# Patient Record
Sex: FEMALE | Race: OTHER | URBAN - METROPOLITAN AREA
[De-identification: names, ages, dates, MRNs, and addresses within clinical notes are randomized per-mention and may not be internally consistent; named-entity substitution may affect disease eponyms.]

---

## 2023-09-07 ENCOUNTER — OFFICE VISIT (OUTPATIENT)
Dept: AUDIOLOGY | Facility: CLINIC | Age: 88
End: 2023-09-07

## 2023-09-07 DIAGNOSIS — H90.3 SENSORINEURAL HEARING LOSS, BILATERAL: Primary | ICD-10-CM

## 2023-09-07 PROCEDURE — 92593 HEARING AID CHECK, BOTH EARS: CPT | Performed by: AUDIOLOGIST

## 2023-09-07 NOTE — PROGRESS NOTES
HEARING AID FOLLOW-UP    Meghan Tee  2/2/1931  [id number]    Patient is here for hearing test/hearing aid repair. Patient is here with friend who works with her. German translation provided by language line Rosa #[id number]. Patient has referral from [facility] PCP Charu Mckeon for hearing testing. The patient has the following concerns:   Patient's friend explained that she has hearing aids that are not working and she would like to see about getting them fixed or getting new hearing aids. Medical records show that aids were obtained from [facility] in 2020.  is [employer]. Explained to patient that [employer] hearing aids are not a  that our office can repair or support. Also explained that her currently listed insurance (Medicare) does not provide hearing aids and thus any hearing aid services would be out of pocket payment. In office the following actions were taken:  Offered to provide hearing test anyway. Patient declined. Advised she should seek hearing aid repair services from the dispensing office. N/C consultation today.        9/7/2023  Kb Ortega

## 2024-07-31 ENCOUNTER — HOSPITAL ENCOUNTER (OUTPATIENT)
Dept: GENERAL RADIOLOGY | Facility: HOSPITAL | Age: 89
Discharge: HOME | End: 2024-07-31
Attending: ORTHOPAEDIC SURGERY
Payer: MEDICAID

## 2024-07-31 ENCOUNTER — OFFICE VISIT (OUTPATIENT)
Dept: ORTHOPEDICS CLINIC | Facility: CLINIC | Age: 89
End: 2024-07-31

## 2024-07-31 VITALS — BODY MASS INDEX: 22.38 KG/M2 | WEIGHT: 114 LBS | HEIGHT: 60 IN

## 2024-07-31 DIAGNOSIS — R52 PAIN: Primary | ICD-10-CM

## 2024-07-31 DIAGNOSIS — M17.11 PRIMARY OSTEOARTHRITIS OF RIGHT KNEE: ICD-10-CM

## 2024-07-31 DIAGNOSIS — R52 PAIN: ICD-10-CM

## 2024-07-31 PROCEDURE — 73562 X-RAY EXAM OF KNEE 3: CPT | Performed by: ORTHOPAEDIC SURGERY

## 2024-07-31 PROCEDURE — 99203 OFFICE O/P NEW LOW 30 MIN: CPT | Performed by: ORTHOPAEDIC SURGERY

## 2024-07-31 RX ORDER — EMPAGLIFLOZIN 10 MG/1
TABLET, FILM COATED ORAL
COMMUNITY

## 2024-07-31 RX ORDER — FLURBIPROFEN SODIUM 0.3 MG/ML
1 SOLUTION/ DROPS OPHTHALMIC DAILY
COMMUNITY
Start: 2024-07-13

## 2024-07-31 RX ORDER — METOPROLOL SUCCINATE 25 MG/1
25 TABLET, EXTENDED RELEASE ORAL DAILY
COMMUNITY

## 2024-07-31 RX ORDER — AMMONIUM LACTATE 12 G/100G
CREAM TOPICAL
COMMUNITY
Start: 2024-07-12

## 2024-07-31 RX ORDER — LISINOPRIL 10 MG/1
10 TABLET ORAL DAILY
COMMUNITY

## 2024-07-31 RX ORDER — LEVOTHYROXINE SODIUM 0.03 MG/1
TABLET ORAL
COMMUNITY
Start: 2024-07-12

## 2024-07-31 RX ORDER — ATORVASTATIN CALCIUM 40 MG/1
40 TABLET, FILM COATED ORAL DAILY
COMMUNITY

## 2024-07-31 RX ORDER — ASPIRIN 81 MG/1
81 TABLET ORAL DAILY
COMMUNITY
Start: 2024-02-13

## 2024-07-31 RX ORDER — GLIPIZIDE 10 MG/1
1 TABLET, FILM COATED, EXTENDED RELEASE ORAL
COMMUNITY
Start: 2023-04-13

## 2024-07-31 RX ORDER — DONEPEZIL HYDROCHLORIDE 10 MG/1
10 TABLET, FILM COATED ORAL DAILY
COMMUNITY
Start: 2023-02-27

## 2024-07-31 RX ORDER — DIAPER,BRIEF,INFANT-TODD,DISP
1 EACH MISCELLANEOUS 2 TIMES DAILY
COMMUNITY
Start: 2021-09-28

## 2024-07-31 RX ORDER — INSULIN GLARGINE 100 [IU]/ML
INJECTION, SOLUTION SUBCUTANEOUS
COMMUNITY

## 2024-07-31 NOTE — PROGRESS NOTES
NURSING INTAKE COMMENTS:   Chief Complaint   Patient presents with    Knee Pain     Consult - L knee - Pt states she heard a pop in knee about 1 month ago.  Pt states pain and difficulty going up  and down stairs ever since. Pt here with her family member. Language line in room       HPI: This 89 year old female presents today with complaints of mild left knee pain and some heaviness in the leg when she walks.  She felt a pop in the knee about a month ago and had quite a bit of pain at the time and could not stand on it.  The symptoms have largely resolved, and she has only a little bit of discomfort in the left knee now.  Translation was provided by the language line.    History reviewed. No pertinent past medical history.  History reviewed. No pertinent surgical history.  Current Outpatient Medications   Medication Sig Dispense Refill    Ammonium Lactate 12 % External Cream 1 APPLICATION TOPICALLY TO AFFECTED AREA 2 TIMES PER DAY      aspirin 81 MG Oral Tab EC Take 1 tablet (81 mg total) by mouth daily.      atorvastatin 40 MG Oral Tab Take 1 tablet (40 mg total) by mouth daily.      cholecalciferol 125 MCG (5000 UT) Oral Tab Take 1 tablet (5,000 Units total) by mouth daily.      donepezil 10 MG Oral Tab Take 1 tablet (10 mg total) by mouth daily.      JARDIANCE 10 MG Oral Tab TAKE ONE TABLET BY MOUTH DAILY . TOME 1 TABLETA POR LA BOCA DIARIA      hydrocortisone 1 % External Ointment Apply 1 Application topically 2 (two) times daily.      glipiZIDE ER 10 MG Oral Tablet 24 Hr Take 1 tablet (10 mg total) by mouth before breakfast.      BASAGLAR KWIKPEN 100 UNIT/ML Subcutaneous Solution Pen-injector INJECT 16 UNITS UNDER SKIN AT BEDTIME. REPLACES BASAGLAR      BD PEN NEEDLE MINI U/F 31G X 5 MM Does not apply Misc 1 strip by In Vitro route daily.      levothyroxine 25 MCG Oral Tab TAKE ONE TABLET BY MOUTH DAILY FOR HYPOTHYROID      metoprolol succinate ER 25 MG Oral Tablet 24 Hr Take 1 tablet (25 mg total) by mouth  daily.      metFORMIN 850 MG Oral Tab Take 1 tablet (850 mg total) by mouth 2 (two) times daily with meals.      lisinopril 10 MG Oral Tab Take 1 tablet (10 mg total) by mouth daily.       Not on File  History reviewed. No pertinent family history.    Social History     Occupational History    Not on file   Tobacco Use    Smoking status: Not on file    Smokeless tobacco: Not on file   Substance and Sexual Activity    Alcohol use: Not on file    Drug use: Not on file    Sexual activity: Not on file        Review of Systems:  GENERAL: feels generally well, no significant weight loss or weight gain  SKIN: no ulcerated or worrisome skin lesions  EYES:denies blurred vision or double vision  HEENT: denies new nasal congestion, sinus pain or ST  LUNGS: denies shortness of breath  CARDIOVASCULAR: denies chest pain  GI: no hematemesis, no worsening heartburn, no diarrhea  : no dysuria, no blood in urine, no difficulty urinating, no incontinence  MUSCULOSKELETAL: no other musculoskeletal complaints other than in HPI  NEURO: no numbness or tingling, no weakness or balance disorder  PSYCHE: no depression or anxiety  HEMATOLOGIC: no hx of blood dyscrasia  ENDOCRINE: no thyroid or diabetes issues  ALL/ASTHMA: no new hx of severe allergy or asthma    Physical Examination:    Ht 5' (1.524 m)   Wt 114 lb (51.7 kg)   BMI 22.26 kg/m²   Constitutional: appears well hydrated, alert and responsive, no acute distress noted  Extremities: No effusion left knee.  Full extension actively against gravity and resistance and flexion to 120 degrees.  No instability.  Neurological: Active plantarflexion dorsiflexion of the foot and knee extension.  Pulses: 2+ posterior tibial    Imaging:mild degenerative change.     No results found for: \"WBC\", \"HGB\", \"PLT\"   No results found for: \"GLU\", \"BUN\", \"CREATSERUM\", \"GFR\", \"GFRNAA\", \"GFRAA\"     Assessment and Plan:  Diagnoses and all orders for this visit:    Pain  -     XR KNEE (3 VIEWS), LEFT  (CPT=73562); Future    Primary osteoarthritis of right knee        Assessment: Mild arthritis in the left knee with exacerbation.    Plan: I offered her cortisone injection which she declined because she is not having much pain now.  If the symptoms increase she will follow-up for cortisone injection at that time.  Also recommended physical therapy.  Apparently she is having therapy once a week, and I suggested she do the exercises independently in between therapy sessions.    The above note was creating using Dragon speech recognition technology. Please excuse any typos.    MADISON CASH MD

## 2024-10-09 ENCOUNTER — OFFICE VISIT (OUTPATIENT)
Dept: AUDIOLOGY | Facility: CLINIC | Age: 89
End: 2024-10-09

## 2024-10-09 DIAGNOSIS — H90.3 SENSORINEURAL HEARING LOSS, BILATERAL: Primary | ICD-10-CM

## 2024-10-09 PROCEDURE — 92557 COMPREHENSIVE HEARING TEST: CPT | Performed by: AUDIOLOGIST

## 2024-10-09 NOTE — PROGRESS NOTES
AUDIOGRAM     Karissa Hinds was referred for testing by Nikko Abrams.   2/2/1931  [id number]      Patient is here for testing on referral from URSZULA Velarde  She is accompanied by her caregiver  Sammarinese translation was provided by the video language Pillo fong.  Patient has referral from [facility]    She was last seen in the office in 2023  No testing was done at that time    After some discussion today, it was discovered that patient has Medicaid as well as Medicare.    Otoscopic Inspection:  both ears: mild cerumen and TM visualized    Audio testing was done- see graph  Testing could not be competed for speech discrimination as patient did not understand the task despite multiple attempts to explain via  and caregiver.      Recommendations:  Follow up with URSZULA Velarde.  Audiological monitoring as needed during the course of medical management.      NOTE: patient was given a copy of the audiogram and a list of providers who work with their insurance to obtain hearing aids    10/9/2024  Kb Chester

## 2025-03-12 ENCOUNTER — TELEPHONE (OUTPATIENT)
Dept: AUDIOLOGY | Facility: CLINIC | Age: OVER 89
End: 2025-03-12

## 2025-03-12 NOTE — TELEPHONE ENCOUNTER
Macy [facility] calling to request order [id number] completed 10/09/2024 at 10:22. The results were too dark to view and requesting new. Please fax to [phone], please include patients name,thanks.

## 2025-03-13 NOTE — TELEPHONE ENCOUNTER
Macy from [facility] states she spoke to medical records and they transferred her back to Audiology and states they can not give her medical records. Macy states the audio test results are too dark and asking for them to be sent again more clear. Please fax to [phone]. Please advise.

## 2025-05-26 ENCOUNTER — HOSPITAL ENCOUNTER (EMERGENCY)
Facility: HOSPITAL | Age: OVER 89
Discharge: HOME | End: 2025-05-26
Attending: EMERGENCY MEDICINE
Payer: MEDICARE

## 2025-05-26 ENCOUNTER — APPOINTMENT (OUTPATIENT)
Dept: CT IMAGING | Facility: HOSPITAL | Age: OVER 89
End: 2025-05-26
Attending: EMERGENCY MEDICINE
Payer: MEDICARE

## 2025-05-26 VITALS
SYSTOLIC BLOOD PRESSURE: 169 MMHG | DIASTOLIC BLOOD PRESSURE: 73 MMHG | BODY MASS INDEX: 20.2 KG/M2 | WEIGHT: 114 LBS | RESPIRATION RATE: 16 BRPM | TEMPERATURE: 99 F | HEART RATE: 89 BPM | HEIGHT: 63 IN | OXYGEN SATURATION: 98 %

## 2025-05-26 DIAGNOSIS — S09.90XA INJURY OF HEAD, INITIAL ENCOUNTER: Primary | ICD-10-CM

## 2025-05-26 DIAGNOSIS — S00.91XA ABRASION OF HEAD, INITIAL ENCOUNTER: ICD-10-CM
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
## 2025-05-26 PROCEDURE — 99284 EMERGENCY DEPT VISIT MOD MDM: CPT

## 2025-05-26 PROCEDURE — 72125 CT NECK SPINE W/O DYE: CPT | Performed by: EMERGENCY MEDICINE

## 2025-05-26 PROCEDURE — 70450 CT HEAD/BRAIN W/O DYE: CPT | Performed by: EMERGENCY MEDICINE

## 2025-05-26 NOTE — ED INITIAL ASSESSMENT (HPI)
Pt brought in by family friend  for fall and head injury.  Per friend pt tripped and fell backward hit the back of the head on a concrete floor, sustaining scalp laceration happened 45 minutes ago,  Denies LOC.  Pt taking Aspirin 81 mgs daily.  Pt Icelandic speaking, alert, breathing unlabored.  Pt denies pain.

## 2025-05-26 NOTE — ED PROVIDER NOTES
Patient Seen in: [facility] Emergency Department    History     Chief Complaint   Patient presents with    Fall    Head Injury       HPI    89-year-old female presents ER status post fall.  Patient's grandson at bedside states he had mechanical fall where she fell backwards and hit her head.  Patient did fall on the concrete but states did not lose consciousness.  Patient with positive abrasion to the head.  Patient denies any neck pain.  Patient denies any loss consciousness.    History reviewed. Past Medical History[1]    History reviewed. Past Surgical History[2]      Medications :  Prescriptions Prior to Admission[3]     Family History[4]    Smoking Status: Social Hx on file[5]    ROS  All pertinent positives for the review of systems are mentioned in the HPI  All other organ systems are reviewed and are negative.    Constitutional and vital signs reviewed.      Social History and Family History elements reviewed from today, pertinent positives to the presenting problem noted.    Physical Exam     ED Triage Vitals [05/26/25 1548]   /81   Pulse 94   Resp 20   Temp 98.5 °F (36.9 °C)   Temp src Oral   SpO2 94 %   O2 Device None (Room air)       All measures to prevent infection transmission during my interaction with the patient were taken. The patient was already wearing a droplet mask on my arrival to the room. Personal protective equipment including droplet mask, eye protection, and gloves were worn throughout the duration of the exam.  Handwashing was performed prior to and after the exam.  Stethoscope and any equipment used during my examination was cleaned with super sani-cloth germicidal wipes following the exam.     Physical Exam  Vitals and nursing note reviewed.   Constitutional:       Appearance: She is well-developed.   HENT:      Head: Normocephalic and atraumatic.        Comments: Positive abrasion to the cervical area of the posterior scalp     Right Ear: External ear normal.      Left  Ear: External ear normal.      Nose: Nose normal.   Eyes:      Conjunctiva/sclera: Conjunctivae normal.      Pupils: Pupils are equal, round, and reactive to light.   Neck:      Vascular: No carotid bruit.   Cardiovascular:      Rate and Rhythm: Normal rate and regular rhythm.      Heart sounds: Normal heart sounds.   Pulmonary:      Effort: Pulmonary effort is normal.      Breath sounds: Normal breath sounds.   Abdominal:      General: Bowel sounds are normal.      Palpations: Abdomen is soft.   Musculoskeletal:         General: Normal range of motion.      Cervical back: Normal range of motion and neck supple. No rigidity or tenderness.   Lymphadenopathy:      Cervical: No cervical adenopathy.   Skin:     General: Skin is warm and dry.   Neurological:      Mental Status: She is alert and oriented to person, place, and time.      Deep Tendon Reflexes: Reflexes are normal and symmetric.   Psychiatric:         Behavior: Behavior normal.         Thought Content: Thought content normal.         Judgment: Judgment normal.         ED Course      Labs Reviewed - No data to display      Imaging Results Available and Reviewed while in ED: CT BRAIN OR HEAD (CPT=70450)  Result Date: 5/26/2025  CONCLUSION:  1. No acute intracranial finding. 2. Right parietal scalp laceration. 3. Advanced changes of chronic small vessel disease in cerebral white matter. 4. Large vessel intracranial atherosclerosis.    Dictated by (CST): Juan F Singleton MD on 5/26/2025 at 5:42 PM     Finalized by (CST): Juan F Singleton MD on 5/26/2025 at 5:43 PM          CT SPINE CERVICAL (CPT=72125)  Result Date: 5/26/2025  CONCLUSION:  1. No acute fracture or subluxation.    Dictated by (CST): Juan F Singleton MD on 5/26/2025 at 5:38 PM     Finalized by (CST): Juan F Singleton MD on 5/26/2025 at 5:42 PM          ED Medications Administered: Medications - No data to display      MDM     Vitals:    05/26/25 1548 05/26/25 1600 05/26/25 1615   BP: 138/81     Pulse: 94  94 91   Resp: 20 16 16   Temp: 98.5 °F (36.9 °C)     TempSrc: Oral     SpO2: 94% 96% 96%   Weight: 51.7 kg     Height: 160 cm (5' 3\")       *I personally reviewed and interpreted all ED vitals.  I also personally reviewed all labs and imaging if ordered    Pulse Ox: 94%, Room air, Normal     Monitor Interpretation:   normal sinus rhythm    Differential Diagnosis/ Diagnostic Considerations: Head laceration, head contusion, skull fracture, cervical spine fracture    Medical Record Review: I personally reviewed available prior medical records for any recent pertinent discharge summaries, testing, and procedures and reviewed those reports.    Complicating Factors: The patient already has does not have any pertinent problems on file. to contribute to the complexity of this ED evaluation.    Medical Decision Making  89-year-old female presents ER status post fall.  Patient with laceration/abrasion to the back of the head.  Patient CT of the head and cervical spine showed no fracture or bleed.  Patient denies any other injuries.  Patient states that she feels fine will be discharged home.  Patient's grandson at bedside made aware of the discharge plan disposition.    Problems Addressed:  Abrasion of head, initial encounter: acute illness or injury  Fall, initial encounter: acute illness or injury  Injury of head, initial encounter: acute illness or injury    Amount and/or Complexity of Data Reviewed  Independent Historian:      Details: Medical history obtained from the grandson at bedside because patient is Mauritian-speaking predominantly.  Radiology: ordered and independent interpretation performed. Decision-making details documented in ED Course.     Details: CT brain reviewed by myself shows no fracture or bleed.        Condition upon leaving the department: Stable    Disposition and Plan     Clinical Impression:  1. Injury of head, initial encounter    2. Abrasion of head, initial encounter    3. Fall, initial  encounter        Disposition:  Discharge    Follow-up:  [facility] Emergency Department  [address]  [facility] [address]  [phone]  Go to  If symptoms worsen      Medications Prescribed:  Current Discharge Medication List                 [1] No past medical history on file.  [2] No past surgical history on file.  [3] (Not in a hospital admission)   [4] No family history on file.  [5]   Social History  Socioeconomic History    Marital status: Single

## 2025-06-16 ENCOUNTER — HOSPITAL ENCOUNTER (INPATIENT)
Facility: HOSPITAL | Age: OVER 89
LOS: 3 days | Discharge: HOME HEALTH | End: 2025-06-21
Attending: EMERGENCY MEDICINE | Admitting: HOSPITALIST
Payer: MEDICARE

## 2025-06-16 ENCOUNTER — HOSPITAL ENCOUNTER (INPATIENT)
Facility: HOSPITAL | Age: OVER 89
LOS: 3 days | Discharge: HOME | End: 2025-06-21
Attending: EMERGENCY MEDICINE
Payer: MEDICARE

## 2025-06-16 DIAGNOSIS — N17.9 ACUTE RENAL FAILURE SUPERIMPOSED ON CHRONIC KIDNEY DISEASE, UNSPECIFIED ACUTE RENAL FAILURE TYPE, UNSPECIFIED CKD STAGE: Primary | ICD-10-CM

## 2025-06-16 DIAGNOSIS — E86.0 DEHYDRATION: ICD-10-CM

## 2025-06-16 DIAGNOSIS — N18.9 ACUTE RENAL FAILURE SUPERIMPOSED ON CHRONIC KIDNEY DISEASE, UNSPECIFIED ACUTE RENAL FAILURE TYPE, UNSPECIFIED CKD STAGE: Primary | ICD-10-CM

## 2025-06-16 LAB
BASOPHILS # BLD AUTO: 0.05 X10(3) UL (ref 0–0.2)
BASOPHILS NFR BLD AUTO: 0.5 %
DEPRECATED RDW RBC AUTO: 48.8 FL (ref 35.1–46.3)
EOSINOPHIL # BLD AUTO: 0.03 X10(3) UL (ref 0–0.7)
EOSINOPHIL NFR BLD AUTO: 0.3 %
ERYTHROCYTE [DISTWIDTH] IN BLOOD BY AUTOMATED COUNT: 17.6 % (ref 11–15)
HCT VFR BLD AUTO: 35.5 % (ref 35–48)
HGB BLD-MCNC: 11 G/DL (ref 12–16)
IMM GRANULOCYTES # BLD AUTO: 0.04 X10(3) UL (ref 0–1)
IMM GRANULOCYTES NFR BLD: 0.4 %
LYMPHOCYTES # BLD AUTO: 1.52 X10(3) UL (ref 1–4)
LYMPHOCYTES NFR BLD AUTO: 15.4 %
MCH RBC QN AUTO: 24.1 PG (ref 26–34)
MCHC RBC AUTO-ENTMCNC: 31 G/DL (ref 31–37)
MCV RBC AUTO: 77.9 FL (ref 80–100)
MONOCYTES # BLD AUTO: 0.66 X10(3) UL (ref 0.1–1)
MONOCYTES NFR BLD AUTO: 6.7 %
NEUTROPHILS # BLD AUTO: 7.59 X10 (3) UL (ref 1.5–7.7)
NEUTROPHILS # BLD AUTO: 7.59 X10(3) UL (ref 1.5–7.7)
NEUTROPHILS NFR BLD AUTO: 76.7 %
PLATELET # BLD AUTO: 278 10(3)UL (ref 150–450)
RBC # BLD AUTO: 4.56 X10(6)UL (ref 3.8–5.3)
WBC # BLD AUTO: 9.9 X10(3) UL (ref 4–11)

## 2025-06-17 PROBLEM — N17.9 ARF (ACUTE RENAL FAILURE): Status: ACTIVE | Noted: 2025-06-17

## 2025-06-17 PROBLEM — E86.0 DEHYDRATION: Status: ACTIVE | Noted: 2025-06-17

## 2025-06-17 PROBLEM — N18.9 ACUTE RENAL FAILURE SUPERIMPOSED ON CHRONIC KIDNEY DISEASE, UNSPECIFIED ACUTE RENAL FAILURE TYPE, UNSPECIFIED CKD STAGE: Status: ACTIVE | Noted: 2025-06-17

## 2025-06-17 PROBLEM — D64.9 ANEMIA: Status: ACTIVE | Noted: 2025-06-17

## 2025-06-17 PROBLEM — E87.1 HYPONATREMIA: Status: ACTIVE | Noted: 2025-06-17

## 2025-06-17 PROBLEM — N17.9 ACUTE RENAL FAILURE SUPERIMPOSED ON CHRONIC KIDNEY DISEASE, UNSPECIFIED ACUTE RENAL FAILURE TYPE, UNSPECIFIED CKD STAGE: Status: ACTIVE | Noted: 2025-06-17

## 2025-06-17 LAB
ANION GAP SERPL CALC-SCNC: 12 MMOL/L (ref 0–18)
ANION GAP SERPL CALC-SCNC: 12 MMOL/L (ref 0–18)
BILIRUB UR QL: NEGATIVE
BUN BLD-MCNC: 37 MG/DL (ref 9–23)
BUN BLD-MCNC: 41 MG/DL (ref 9–23)
BUN/CREAT SERPL: 13.5 (ref 10–20)
BUN/CREAT SERPL: 15.6 (ref 10–20)
CALCIUM BLD-MCNC: 8.3 MG/DL (ref 8.7–10.4)
CALCIUM BLD-MCNC: 8.7 MG/DL (ref 8.7–10.4)
CHLORIDE SERPL-SCNC: 100 MMOL/L (ref 98–112)
CHLORIDE SERPL-SCNC: 102 MMOL/L (ref 98–112)
CO2 SERPL-SCNC: 23 MMOL/L (ref 21–32)
CO2 SERPL-SCNC: 24 MMOL/L (ref 21–32)
CREAT BLD-MCNC: 2.63 MG/DL (ref 0.55–1.02)
CREAT BLD-MCNC: 2.75 MG/DL (ref 0.55–1.02)
EGFRCR SERPLBLD CKD-EPI 2021: 16 ML/MIN/1.73M2 (ref 60–?)
EGFRCR SERPLBLD CKD-EPI 2021: 16 ML/MIN/1.73M2 (ref 60–?)
EST. AVERAGE GLUCOSE BLD GHB EST-MCNC: 212 MG/DL (ref 68–126)
GLUCOSE BLD-MCNC: 177 MG/DL (ref 70–99)
GLUCOSE BLD-MCNC: 265 MG/DL (ref 70–99)
GLUCOSE BLDC GLUCOMTR-MCNC: 158 MG/DL (ref 70–99)
GLUCOSE BLDC GLUCOMTR-MCNC: 168 MG/DL (ref 70–99)
GLUCOSE BLDC GLUCOMTR-MCNC: 172 MG/DL (ref 70–99)
GLUCOSE BLDC GLUCOMTR-MCNC: 286 MG/DL (ref 70–99)
GLUCOSE BLDC GLUCOMTR-MCNC: 313 MG/DL (ref 70–99)
GLUCOSE UR-MCNC: >1000 MG/DL
HBA1C MFR BLD: 9 % (ref ?–5.7)
HYALINE CASTS #/AREA URNS AUTO: PRESENT /LPF
LEUKOCYTE ESTERASE UR QL STRIP.AUTO: 25
NITRITE UR QL STRIP.AUTO: NEGATIVE
OSMOLALITY SERPL CALC.SUM OF ELEC: 298 MOSM/KG (ref 275–295)
OSMOLALITY SERPL CALC.SUM OF ELEC: 300 MOSM/KG (ref 275–295)
PH UR: 5.5 [PH] (ref 5–8)
POTASSIUM SERPL-SCNC: 3.7 MMOL/L (ref 3.5–5.1)
POTASSIUM SERPL-SCNC: 4.7 MMOL/L (ref 3.5–5.1)
PROT UR-MCNC: 50 MG/DL
SODIUM SERPL-SCNC: 135 MMOL/L (ref 136–145)
SODIUM SERPL-SCNC: 138 MMOL/L (ref 136–145)
SP GR UR STRIP: 1.01 (ref 1–1.03)
UROBILINOGEN UR STRIP-ACNC: NORMAL

## 2025-06-17 PROCEDURE — 99223 1ST HOSP IP/OBS HIGH 75: CPT | Performed by: INTERNAL MEDICINE

## 2025-06-17 RX ORDER — LEVOTHYROXINE SODIUM 25 UG/1
25 TABLET ORAL
Status: DISCONTINUED | OUTPATIENT
Start: 2025-06-17 | End: 2025-06-21

## 2025-06-17 RX ORDER — DONEPEZIL HYDROCHLORIDE 10 MG/1
10 TABLET, FILM COATED ORAL DAILY
Status: DISCONTINUED | OUTPATIENT
Start: 2025-06-17 | End: 2025-06-21

## 2025-06-17 RX ORDER — DEXTROSE MONOHYDRATE 25 G/50ML
50 INJECTION, SOLUTION INTRAVENOUS
Status: DISCONTINUED | OUTPATIENT
Start: 2025-06-17 | End: 2025-06-21

## 2025-06-17 RX ORDER — ROSUVASTATIN CALCIUM 20 MG/1
40 TABLET, COATED ORAL NIGHTLY
Status: DISCONTINUED | OUTPATIENT
Start: 2025-06-17 | End: 2025-06-17

## 2025-06-17 RX ORDER — NICOTINE POLACRILEX 4 MG
15 LOZENGE BUCCAL
Status: DISCONTINUED | OUTPATIENT
Start: 2025-06-17 | End: 2025-06-21

## 2025-06-17 RX ORDER — INSULIN DEGLUDEC 100 U/ML
10 INJECTION, SOLUTION SUBCUTANEOUS NIGHTLY
Status: DISCONTINUED | OUTPATIENT
Start: 2025-06-17 | End: 2025-06-20

## 2025-06-17 RX ORDER — ASPIRIN 81 MG/1
81 TABLET ORAL DAILY
Status: DISCONTINUED | OUTPATIENT
Start: 2025-06-17 | End: 2025-06-21

## 2025-06-17 RX ORDER — SODIUM CHLORIDE 9 MG/ML
INJECTION, SOLUTION INTRAVENOUS CONTINUOUS
Status: DISCONTINUED | OUTPATIENT
Start: 2025-06-17 | End: 2025-06-21

## 2025-06-17 RX ORDER — NICOTINE POLACRILEX 4 MG
30 LOZENGE BUCCAL
Status: DISCONTINUED | OUTPATIENT
Start: 2025-06-17 | End: 2025-06-21

## 2025-06-17 RX ORDER — HEPARIN SODIUM 5000 [USP'U]/ML
5000 INJECTION, SOLUTION INTRAVENOUS; SUBCUTANEOUS EVERY 12 HOURS SCHEDULED
Status: DISCONTINUED | OUTPATIENT
Start: 2025-06-17 | End: 2025-06-21

## 2025-06-17 RX ORDER — METOPROLOL SUCCINATE 25 MG/1
25 TABLET, EXTENDED RELEASE ORAL DAILY
Status: DISCONTINUED | OUTPATIENT
Start: 2025-06-17 | End: 2025-06-21

## 2025-06-17 RX ORDER — ROSUVASTATIN CALCIUM 40 MG/1
40 TABLET, COATED ORAL NIGHTLY
COMMUNITY

## 2025-06-17 RX ORDER — ATORVASTATIN CALCIUM 40 MG/1
40 TABLET, FILM COATED ORAL NIGHTLY
Status: DISCONTINUED | OUTPATIENT
Start: 2025-06-17 | End: 2025-06-21

## 2025-06-17 RX ORDER — ACETAMINOPHEN 500 MG
500 TABLET ORAL EVERY 4 HOURS PRN
Status: DISCONTINUED | OUTPATIENT
Start: 2025-06-17 | End: 2025-06-21

## 2025-06-17 NOTE — PLAN OF CARE
Problem: Patient Centered Care  Goal: Patient preferences are identified and integrated in the patient's plan of care  Description: Interventions:  - What would you like us to know as we care for you? From home alone with caregiver for part of the day.  - Provide timely, complete, and accurate information to patient/family  - Incorporate patient and family knowledge, values, beliefs, and cultural backgrounds into the planning and delivery of care  - Encourage patient/family to participate in care and decision-making at the level they choose  - Honor patient and family perspectives and choices  Outcome: Progressing     Problem: Diabetes/Glucose Control  Goal: Glucose maintained within prescribed range  Description: INTERVENTIONS:  - Monitor Blood Glucose as ordered  - Assess for signs and symptoms of hyperglycemia and hypoglycemia  - Administer ordered medications to maintain glucose within target range  - Assess barriers to adequate nutritional intake and initiate nutrition consult as needed  - Instruct patient on self management of diabetes  Outcome: Progressing     Problem: SAFETY ADULT - FALL  Goal: Free from fall injury  Description: INTERVENTIONS:  - Assess pt frequently for physical needs  - Identify cognitive and physical deficits and behaviors that affect risk of falls.  - [place] fall precautions as indicated by assessment.  - Educate pt/family on patient safety including physical limitations  - Instruct pt to call for assistance with activity based on assessment  - Modify environment to reduce risk of injury  - Provide assistive devices as appropriate  - Consider OT/PT consult to assist with strengthening/mobility  - Encourage toileting schedule  Outcome: Progressing     Problem: DISCHARGE PLANNING  Goal: Discharge to home or other facility with appropriate resources  Description: INTERVENTIONS:  - Identify barriers to discharge w/pt and caregiver  - Include patient/family/discharge partner in  discharge planning  - Arrange for needed discharge resources and transportation as appropriate  - Identify discharge learning needs (meds, wound care, etc)  - Arrange for interpreters to assist at discharge as needed  - Consider post-discharge preferences of patient/family/discharge partner  - Complete POLST form as appropriate  - Assess patient's ability to be responsible for managing their own health  - Refer to Case Management Department for coordinating discharge planning if the patient needs post-hospital services based on physician/LIP order or complex needs related to functional status, cognitive ability or social support system  Outcome: Progressing     Problem: Altered Communication/Language Barrier  Goal: Patient/Family is able to understand and participate in their care  Description: Interventions:  - Assess communication ability and preferred communication style  - Implement communication aides and strategies  - Use visual cues when possible  - Listen attentively, be patient, do not interrupt  - Minimize distractions  - Allow time for understanding and response  - Establish method for patient to ask for assistance (call light)  - Provide an  as needed  - Communicate barriers and strategies to overcome with those who interact with patient  Outcome: Progressing     Problem: METABOLIC/FLUID AND ELECTROLYTES - ADULT  Goal: Glucose maintained within prescribed range  Description: INTERVENTIONS:  - Monitor Blood Glucose as ordered  - Assess for signs and symptoms of hyperglycemia and hypoglycemia  - Administer ordered medications to maintain glucose within target range  - Assess barriers to adequate nutritional intake and initiate nutrition consult as needed  - Instruct patient on self management of diabetes  Outcome: Progressing  Goal: Electrolytes maintained within normal limits  Description: INTERVENTIONS:  - Monitor labs and rhythm and assess patient for signs and symptoms of electrolyte  imbalances  - Administer electrolyte replacement as ordered  - Monitor response to electrolyte replacements, including rhythm and repeat lab results as appropriate  - Fluid restriction as ordered  - Instruct patient on fluid and nutrition restrictions as appropriate  Outcome: Progressing  Goal: Hemodynamic stability and optimal renal function maintained  Description: INTERVENTIONS:  - Monitor labs and assess for signs and symptoms of volume excess or deficit  - Monitor intake, output and patient weight  - Monitor urine specific gravity, serum osmolarity and serum sodium as indicated or ordered  - Monitor response to interventions for patient's volume status, including labs, urine output, blood pressure (other measures as available)  - Encourage oral intake as appropriate  - Instruct patient on fluid and nutrition restrictions as appropriate  Outcome: Progressing     Problem: Impaired Activities of Daily Living  Goal: Achieve highest/safest level of independence in self care  Description: Interventions:  - Assess ability and encourage patient to participate in ADLs to maximize function  - Promote sitting position while performing ADLs such as feeding, grooming, and bathing  - Educate and encourage patient/family in tolerated functional activity level and precautions during self-care  Outcome: Progressing     Problem: Impaired Cognition  Goal: Patient will exhibit improved attention, thought processing and/or memory  Description: Interventions:  - Minimize distractions in the room when full attention is required  - Allow additional time for processing after asking questions or providing instructions  - Encourage use of hearing aids  Outcome: Progressing

## 2025-06-17 NOTE — ED INITIAL ASSESSMENT (HPI)
Pt BIB EMS for complaint from pt family of possible dehydration. Per EMS, pt was seen by her home health nurse yesterday and found the pt with the air conditioning off and the house was hot. Family states the pt has not had much to drink today. Pt alert upon arrival. Pt denies any complaints at this time. Respirations even and unlabored.

## 2025-06-17 NOTE — PROGRESS NOTES
Pt from home alone with caregivers. Currently has family friend at bedside. Family to start arriving by tomorrow.     Pt placed on purewick and cleaned up from BM. Ready to go up

## 2025-06-17 NOTE — PROGRESS NOTES
[facility] Pharmacy Dosing Service  Antibiotic Dosing    Karissa Hinds is a 89 year old for whom pharmacy was consulted to dose ceftriaxone (ROCEPHIN) for treatment of UTI.    CrCl: estimated creatinine clearance is 10.7 mL/min (A) (based on SCr of 2.63 mg/dL (H)).    Actual Body Weight:   Wt Readings from Last 1 Encounters:   06/16/25 51.7 kg (113 lb 15.7 oz)    Ideal body weight: 52.4 kg (115 lb 8.3 oz)   Body mass index is 20.19 kg/m².    ceftriaxone (ROCEPHIN) has been ordered per P&T approved protocol.    Thank you,   Jose Cote, PharmD  6/17/2025  6:41 PM

## 2025-06-17 NOTE — H&P
[facility]  part of [facility]                                                                                                          History and Physical     Karissa Hinds Patient Status:  Emergency    1931 MRN [id number]   Location [facility] EMERGENCY DEPARTMENT Attending Promise Fuentes MD   Hosp Day # 0 PCP Nikko Abrams     History obtained from friend 'Anita' at bedside.  Patient is Ukrainian-speaking.    Chief Complaint: Dehydration    Subjective:    Karissa Hinds is a 89 year old female with history of DM, Alzheimer's, HTN who was brought to the ED by family for evaluation of possible dehydration.  Patient was visited by home health nurse and was found in the hot house without AC on.  She was also noted with decreased fluid intake.  Per Anita, due to patient's BP and was in the 70s prompting ED visit.  There has been no reported confusion.      On ED arrival, patient without any complaints.  Vitals with borderline low blood pressure.  Labs significant for sodium 135, creatinine 2.75  UA with 25 leuk esterase, 2+ bacteria.    IV fluids initiated, patient being admitted for further evaluation.    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     Past Surgical History: Past Surgical History[2]  colectomy partial w/anastomosis (07); pr xcapsl ctrc rmvl insj io lens prosth w/o ecp (3-19-09); pr xcapsl ctrc rmvl insj io lens prosth w/o ecp (09); and other (p-oth) ().    Social History:      Family History: Family  History[3]    Allergies: Allergies[4]    Medications:  Medications Ordered Prior to Encounter[5]    Review of Systems:   A comprehensive 14 point review of systems was completed.    Pertinent positives and negatives noted in the HPI.    Objective:     /52   Pulse 82   Temp 98 °F (36.7 °C) (Oral)   Resp 16   SpO2 98%   General: No acute distress.    HEENT: Normocephalic, atraumatic.  Neck: Supple.  Respiratory: Normal effort.  CTAB  Cardiovascular: S1, S2 regular.  Normal rate.   Abdomen: Soft, nontender distended.  Neurologic: Sleeping.  Arouses easily.  Oriented x 3.  No focal deficits.  Musculoskeletal: Healing abrasions to bilateral knees,.  Posterior left thigh.  Extremities: No edema  Psychiatric: Calm    Results:      Labs:  Labs Last 24 Hours:   BMP     CBC    Other     Na 135 Cl 100 BUN 37 Glu 265   Hb 11.0   PTT - Procal -   K 4.7 CO2 23.0 Cr 2.75   WBC 9.9 >< .0  INR - CRP -   Renal Lytes Endo    Hct 35.5   Trop - D dim -   eGFR - Ca 8.7 POC Gluc  -    LFT   pBNP - Lactic -   eGFR AA - PO4 - A1c -   AST - APk - Prot -  LDL -     Mg - TSH -   ALT - T tomas - Alb -          COVID-19 Lab Results    COVID-19  No results found for: \"COVID19\"    Pro-Calcitonin  No results for input(s): \"PCT\" in the last 168 hours.    Cardiac  No results for input(s): \"TROP\", \"PBNP\" in the last 168 hours.    Creatinine Kinase  No results for input(s): \"CK\" in the last 168 hours.    Inflammatory Markers  No results for input(s): \"CRP\", \"SELVIN\", \"LDH\", \"DDIMER\" in the last 168 hours.    Imaging: Imaging data reviewed in Epic.    Assessment & Plan:    Possible dehydration  Reported hypotension at home  Renal insufficiency, unknown chronicity.  Last creatinine 1.05, 2 years ago  -Place in observation  - Hydrate and monitor  - Further workup as appropriate    DM 2  - Hold oral hypoglycemics  - Cover with insulin    HTN  - Home meds as appropriate    Bacteriuria, asymptomatic  - Follow-up cultures    History of dementia  -  Resume home meds as appropriate    Advanced age  -PT/OT  - Family to meet and discuss placement options  - Social work consult      Quality:  DVT Prophylaxis: Heparin  CODE status: Unknown  JUDITH: 2 to 3 days        Plan of care discussed with patient/friend at bedside. Acknowledged understanding and agrees to plan. Also discussed with the ED physician.    High MDM  Time spent on this admission - examining patient, obtaining history, reviewing previous medical records, going over test results/imaging and discussing plan of care. More than 50% of the time was spent in counseling and/or coordination of care related to renal insufficiency, dehydration.   All questions answered.     Evelyn Chavis MD  6/17/2025                   [1] History reviewed. No pertinent past medical history.  [2] History reviewed. No pertinent surgical history.  [3] History reviewed. No pertinent family history.  [4] No Known Allergies  [5]   No current facility-administered medications on file prior to encounter.     Current Outpatient Medications on File Prior to Encounter   Medication Sig Dispense Refill    Ammonium Lactate 12 % External Cream 1 APPLICATION TOPICALLY TO AFFECTED AREA 2 TIMES PER DAY      aspirin 81 MG Oral Tab EC Take 1 tablet (81 mg total) by mouth daily.      atorvastatin 40 MG Oral Tab Take 1 tablet (40 mg total) by mouth daily.      cholecalciferol 125 MCG (5000 UT) Oral Tab Take 1 tablet (5,000 Units total) by mouth daily.      donepezil 10 MG Oral Tab Take 1 tablet (10 mg total) by mouth daily.      JARDIANCE 10 MG Oral Tab TAKE ONE TABLET BY MOUTH DAILY . TOME 1 TABLETA POR LA BOCA DIARIA      hydrocortisone 1 % External Ointment Apply 1 Application topically 2 (two) times daily.      glipiZIDE ER 10 MG Oral Tablet 24 Hr Take 1 tablet (10 mg total) by mouth before breakfast.      BASAGLAR KWIKPEN 100 UNIT/ML Subcutaneous Solution Pen-injector INJECT 16 UNITS UNDER SKIN AT BEDTIME. REPLACES BASAGLAR      BD PEN  NEEDLE MINI U/F 31G X 5 MM Does not apply Misc 1 strip by In Vitro route daily.      levothyroxine 25 MCG Oral Tab TAKE ONE TABLET BY MOUTH DAILY FOR HYPOTHYROID      metoprolol succinate ER 25 MG Oral Tablet 24 Hr Take 1 tablet (25 mg total) by mouth daily.      metFORMIN 850 MG Oral Tab Take 1 tablet (850 mg total) by mouth 2 (two) times daily with meals.      lisinopril 10 MG Oral Tab Take 1 tablet (10 mg total) by mouth daily.

## 2025-06-17 NOTE — PHYSICAL THERAPY NOTE
PHYSICAL THERAPY EVALUATION - INPATIENT     Room Number: 536/536-A  Evaluation Date: 6/17/2025  Type of Evaluation: Initial   Physician Order: PT Eval and Treat    Presenting Problem: dehydration, hypotension  Co-Morbidities : DM, Alzheimer's, HTN  Reason for Therapy: Mobility Dysfunction and Discharge Planning    PHYSICAL THERAPY ASSESSMENT   Patient is a 89 year old female admitted 6/16/2025 for dehydration.  Prior to admission, patient's baseline is modified independent.  Patient is currently functioning below baseline with bed mobility, transfers, gait, stair negotiation, maintaining seated position, standing prolonged periods, and performing household tasks.  Patient is requiring maximum assist as a result of the following impairments: decreased functional strength, decreased endurance/aerobic capacity, pain, impaired sitting/standing balance, decreased muscular endurance, and medical status.  Physical Therapy will continue to follow for duration of hospitalization.    Patient will benefit from continued skilled PT Services to promote return to prior level of function and safety with continuous assistance and gradual rehabilitative therapy .    PLAN DURING HOSPITALIZATION  Nursing Mobility Recommendation :  (1-2 assist for safety)     PT Treatment Plan: Bed mobility, Body mechanics, Coordination, Endurance, Energy conservation, Gait training, Strengthening, Transfer training, Balance training  Rehab Potential : Fair  Frequency (Obs): 3-5x/week     PHYSICAL THERAPY MEDICAL/SOCIAL HISTORY   History related to current admission: Karissa Hinds is a 89 year old female with history of DM, Alzheimer's, HTN who was brought to the ED by family for evaluation of possible dehydration.  Patient was visited by home health nurse and was found in the hot house without AC on.  She was also noted with decreased fluid intake.  Per Anita, due to patient's BP and was in the 70s prompting ED visit.  There has been no reported  confusion.     Problem List  Principal Problem:    Acute renal failure superimposed on chronic kidney disease, unspecified acute renal failure type, unspecified CKD stage  Active Problems:    ARF (acute renal failure)    Anemia    Hyponatremia    Dehydration    HOME SITUATION  Type of Home: House  Home Layout: One level  Stairs to Enter : 1   Lives With: Caregiver part-time, Alone    Drives: No   Patient Regularly Uses: Cane     Prior Level of [place]: Patient is a poor historian. Per discussion with family at bedside, patient was living alone, ambulatory with a cane. Reports a daily CG for a few hours a day.    SUBJECTIVE  \"In that chair?\" Patient [place].    PHYSICAL THERAPY EXAMINATION   OBJECTIVE  Precautions:  needed, Hard of hearing, Bed/chair alarm  Fall Risk: High fall risk    PAIN ASSESSMENT  Ratin    COGNITION  Overall Cognitive Status:  Impaired  Arousal/Alertness:  delayed responses to stimuli  Safety Judgement:  decreased awareness of need for assistance and decreased awareness of need for safety    RANGE OF MOTION AND STRENGTH ASSESSMENT  Upper extremity ROM and strength are within functional limits BUE.  Lower extremity ROM is within functional limits BLE.  Lower extremity strength is within functional limits BLE.    BALANCE  Static Sitting: Poor +  Dynamic Sitting: Poor -  Static Standing: Dependent  Dynamic Standing: Dependent    ACTIVITY TOLERANCE  BP: 97/47  BP Location: Left arm  BP Method: Automatic  Patient Position: Sitting    AM-PAC '6-Clicks' INPATIENT SHORT FORM - BASIC MOBILITY  How much difficulty does the patient currently have...  Patient Difficulty: Turning over in bed (including adjusting bedclothes, sheets and blankets)?: A Lot   Patient Difficulty: Sitting down on and standing up from a chair with arms (e.g., wheelchair, bedside commode, etc.): A Lot   Patient Difficulty: Moving from lying on back to sitting on the side of the bed?: A Lot   How much help from another  person does the patient currently need...   Help from Another: Moving to and from a bed to a chair (including a wheelchair)?: A Lot   Help from Another: Need to walk in hospital room?: Total   Help from Another: Climbing 3-5 steps with a railing?: Total     AM-PAC Score:  Raw Score: 10   Approx Degree of Impairment: 76.75%   Standardized Score (AM-PAC Scale): 32.29   CMS Modifier (G-Code): CL    FUNCTIONAL ABILITY STATUS  Functional Mobility/Gait Assessment  Gait Assistance: Not tested  Rolling: maximum assist  Supine to Sit: maximum assist  Sit to Supine: not tested  Sit to Stand: maximum assist  Bed to Chair: maximum assist    Exercise/Education Provided:  Education Provided To: Patient, Family/Caregiver Patient Education: Role of Physical Therapy, Plan of Care, Discharge Recommendations, DME Recommendations, Functional Transfer Techniques, Fall Prevention, Posture/Positioning, Energy Conservation, Proper Body Mechanics, Gait Training Patient's Response to Education: Demonstrates Poor Carry Over to Information, Requires Further Education     Skilled Therapy Provided: Patient received supine in bed at initiation of session agreeable to participation in PT. Patient tolerates treatment fairly, requires maximal assistance to perform supine to sit and bed to chair transfer. Patient soiled, performed 2 sit to stand transfers in chair in order to assist with hygiene/holden care. Patient left in bedside chair, lines intact, family at bedside, needs in reach and handoff to RN.    The patient's Approx Degree of Impairment: 76.75% has been calculated based on documentation in the [facility] '6 clicks' Inpatient Basic Mobility Short Form.  Research supports that patients with this level of impairment may benefit from LTAC, however given patient's previous level of function will recommend subacute rehab.  Final disposition will be made by interdisciplinary medical team.    Patient End of Session: Up in chair, Needs met, Call light  within reach, RN aware of session/findings, All patient questions and concerns addressed, Hospital anti-slip socks, Alarm set, Family present    CURRENT GOALS  Goals to be met by: 7/1/25  Patient Goal Patient's self-stated goal is: not formally stated   Goal #1 Patient is able to demonstrate supine - sit EOB @ level: supervision     Goal #1   Current Status    Goal #2 Patient is able to demonstrate transfers EOB to/from Chair/Wheelchair at assistance level: supervision with least restrictive device.     Goal #2  Current Status    Goal #3 Patient is able to ambulate >25 feet with assist device: least restrictive device at assistance level: supervision   Goal #3   Current Status    Goal #4 Patient will negotiate 1 stairs/one curb w/ assistive device and supervision   Goal #4   Current Status    Goal #5 Patient to demonstrate independence with home activity/exercise instructions provided to patient in preparation for discharge.   Goal #5   Current Status    Goal #6    Goal #6  Current Status      Patient Evaluation Complexity Level:  History High - 3 or more personal factors and/or co-morbidities   Examination of body systems Moderate - addressing a total of 3 or more elements   Clinical Presentation  Moderate - Evolving   Clinical Decision Making  Moderate Complexity     Therapeutic Activity:  23 minutes    Hien Medina, PT, DPT

## 2025-06-17 NOTE — ED PROVIDER NOTES
Patient Seen in: [facility] ER        History  Chief Complaint   Patient presents with    Well Adult     Stated Complaint: dehydration    Subjective:   HPI            89 year old female was brought to the ER by EMS and accompanied by a family friend for dehydration, possible altered mental status.  Friend states that she checked on the patient and found that the home was extremely high and the AC was not on.  She noted that the patient appeared lethargic and was having difficulty doing her usual ADLs such as getting up with her walker and going to the bathroom easily.  She turned on the AC, hold the patient get ready for bed and left last night.  When she returned today the patient was still moving slowly, complained of being cold last night even though the AC was set to 78 degrees.  Friend states that with some neighbors assistance they were able to get the patient upright and walking to the bathroom.  She did not notice any focal deficit.  The friend at bedside called the patient's family members who live out of town and advised them that she was not sure if the patient should continue living by herself and that they needed to come into town to stay with her.  She also has a caregiver that is out of town at the moment.      Objective:     History reviewed. No pertinent past medical history.           History reviewed. No pertinent surgical history.             No pertinent social history.                              Physical Exam    ED Triage Vitals [06/16/25 2315]   BP 99/47   Pulse 86   Resp 16   Temp 98 °F (36.7 °C)   Temp src Oral   SpO2 98 %   O2 Device None (Room air)       Current Vitals:   Vital Signs  BP: 149/64  Pulse: 103  Resp: 18  Temp: 99.6 °F (37.6 °C)  Temp src: Oral  MAP (mmHg): 89    Oxygen Therapy  SpO2: 95 %  O2 Device: None (Room air)            Physical Exam  Vitals and nursing note reviewed.   Constitutional:       General: She is not in acute distress.     Appearance: Normal  appearance. She is well-developed and underweight. She is not ill-appearing or toxic-appearing.   HENT:      Head: Normocephalic and atraumatic.      Nose: Nose normal.   Eyes:      Extraocular Movements: Extraocular movements intact.      Pupils: Pupils are equal, round, and reactive to light.   Cardiovascular:      Rate and Rhythm: Normal rate and regular rhythm.      Heart sounds: No murmur heard.  Pulmonary:      Effort: Pulmonary effort is normal. No respiratory distress.      Breath sounds: Normal breath sounds. No wheezing or rales.   Chest:      Chest wall: No tenderness.   Abdominal:      General: There is no distension.      Palpations: Abdomen is soft.      Tenderness: There is no abdominal tenderness.   Musculoskeletal:      Cervical back: Normal range of motion. No tenderness.   Skin:     General: Skin is warm.      Capillary Refill: Capillary refill takes less than 2 seconds.      Coloration: Skin is not pale.      Findings: No erythema or rash.   Neurological:      Mental Status: She is alert. She is disoriented.                 ED Course  Labs Reviewed   CBC WITH DIFFERENTIAL WITH PLATELET - Abnormal; Notable for the following components:       Result Value    HGB 11.0 (*)     MCV 77.9 (*)     MCH 24.1 (*)     RDW-SD 48.8 (*)     RDW 17.6 (*)     All other components within normal limits   BASIC METABOLIC PANEL (8) - Abnormal; Notable for the following components:    Glucose 265 (*)     Sodium 135 (*)     BUN 37 (*)     Creatinine 2.75 (*)     Calculated Osmolality 298 (*)     eGFR-Cr 16 (*)     All other components within normal limits   URINALYSIS WITH CULTURE REFLEX - Abnormal; Notable for the following components:    Clarity Urine Turbid (*)     Glucose Urine >1000 (*)     Ketones Urine Trace (*)     Blood Urine 2+ (*)     Protein Urine 50 (*)     Leukocyte Esterase Urine 25 (*)     WBC Urine 11-20 (*)     Bacteria Urine 2+ (*)     Squamous Epi. Cells Few (*)     Hyaline Casts Present (*)     All  other components within normal limits   HEMOGLOBIN A1C - Abnormal; Notable for the following components:    HgbA1C 9.0 (*)     Estimated Average Glucose 212 (*)     All other components within normal limits   POCT GLUCOSE - Abnormal; Notable for the following components:    POC Glucose  168 (*)     All other components within normal limits   RAINBOW DRAW LAVENDER   RAINBOW DRAW LIGHT GREEN   URINE CULTURE, ROUTINE                     MDM     Pulse Ox: 95%, Normal, RA    Medications   glucose (Dex4) 15 GM/59ML oral liquid 15 g (has no administration in time range)     Or   glucose (Glutose) 40% oral gel 15 g (has no administration in time range)     Or   glucose-vitamin C (Dex-4) chewable tab 4 tablet (has no administration in time range)     Or   dextrose 50% injection 50 mL (has no administration in time range)     Or   glucose (Dex4) 15 GM/59ML oral liquid 30 g (has no administration in time range)     Or   glucose (Glutose) 40% oral gel 30 g (has no administration in time range)     Or   glucose-vitamin C (Dex-4) chewable tab 8 tablet (has no administration in time range)   sodium chloride 0.9% infusion ( Intravenous New Bag 6/17/25 0357)   heparin (Porcine) 5000 UNIT/ML injection 5,000 Units (has no administration in time range)   acetaminophen (Tylenol Extra Strength) tab 500 mg (has no administration in time range)   melatonin tab 3 mg (has no administration in time range)   insulin aspart (NovoLOG) 100 Units/mL FlexPen 1-7 Units (has no administration in time range)   sodium chloride 0.9 % IV bolus 1,000 mL (0 mL Intravenous Stopped 6/17/25 0100)           Admission disposition: 6/17/2025  1:28 AM         Patient with BRO, dehydration most likely.  Will send UA for culture.  The patient will be admitted, DC planning needed with family    Medical Decision Making      Disposition and Plan     Clinical Impression:  1. Acute renal failure superimposed on chronic kidney disease, unspecified acute renal failure  type, unspecified CKD stage    2. Dehydration         Disposition:  Admit  6/17/2025  1:28 am    Follow-up:  No follow-up provider specified.  We recommend that you schedule follow up care with a primary care provider within the next three months to obtain basic health screening including reassessment of your blood pressure.      Medications Prescribed:  Current Discharge Medication List                Supplementary Documentation:         Hospital Problems       Present on Admission  Date Reviewed: 6/17/2025          ICD-10-CM Noted POA    * (Principal) Acute renal failure superimposed on chronic kidney disease, unspecified acute renal failure type, unspecified CKD stage N17.9, N18.9 6/17/2025 Unknown    Anemia D64.9 6/17/2025 Yes    ARF (acute renal failure) N17.9 6/17/2025 Unknown    Dehydration E86.0 6/17/2025 Unknown    Hyponatremia E87.1 6/17/2025 Yes

## 2025-06-17 NOTE — OCCUPATIONAL THERAPY NOTE
OCCUPATIONAL THERAPY EVALUATION - INPATIENT     Room Number: 536/536-A  Evaluation Date: 6/17/2025  Type of Evaluation: Initial  Presenting Problem: acute renal failure    Physician Order: IP Consult to Occupational Therapy  Reason for Therapy: ADL/IADL Dysfunction and Discharge Planning    OCCUPATIONAL THERAPY ASSESSMENT   Patient is a 89 year old female admitted 6/16/2025 for acute renal failure.  Per family pta pt was living alone in a 1 story home. Pt has daily CG assist for 'a few hours a day'. Pt reportedly independent w/ adls, meal prep and ambulation w/ a cane.  Patient is currently functioning below baseline with adls and functional mobility.  Patient is requiring maximum assist as a result of the following impairments: decreased functional strength, decreased endurance, and difficulty following commands. Occupational Therapy will continue to follow for duration of hospitalization.    Patient will benefit from continued skilled OT Services to promote return to prior level of function and safety with continuous assistance and gradual rehabilitative therapy.    PLAN DURING HOSPITALIZATION  OT Device Recommendations: TBD  OT Treatment Plan: Compensatory technique education, Patient/Family training, Patient/Family education, Endurance training, Functional transfer training, ADL training     OCCUPATIONAL THERAPY MEDICAL/SOCIAL HISTORY   Problem List  Principal Problem:    Acute renal failure superimposed on chronic kidney disease, unspecified acute renal failure type, unspecified CKD stage  Active Problems:    ARF (acute renal failure)    Anemia    Hyponatremia    Dehydration    HOME SITUATION  Type of Home: House  Home Layout: One level  Lives With: Alone; Caregiver part-time  Other Equipment: -- (cane)  Drives: No  Patient Regularly Uses: -- (cane)    Stairs in Home: 1 stair to enter  Use of Assistive Device(s): cane    Prior Level of [place]: Per family pta pt was living alone in a 1 story home. Pt has  daily CG assist for 'a few hours a day'. Pt reportedly independent w/ adls, meal prep and ambulation w/ a cane    SUBJECTIVE  Pt offering no compliants    OCCUPATIONAL THERAPY EXAMINATION      OBJECTIVE  Precautions: Hard of hearing; Bed/chair alarm  Fall Risk: High fall risk    PAIN ASSESSMENT  Rating: -- (pt offering no c/o pain)    ACTIVITY TOLERANCE  Fair  BP 97/47 up in chair    O2 SATURATIONS  Activity on room air    COGNITION  Pt oriented to self    Communication: pt required [place]ic translation    Behavioral/Emotional/Social: confused    RANGE OF MOTION   Upper extremity ROM is within functional limits     STRENGTH ASSESSMENT  Upper extremity strength is within functional limits     ACTIVITIES OF DAILY LIVING ASSESSMENT  AM-PAC ‘6-Clicks’ Inpatient Daily Activity Short Form  How much help from another person does the patient currently need…  -   Putting on and taking off regular lower body clothing?: A Lot  -   Bathing (including washing, rinsing, drying)?: A Lot  -   Toileting, which includes using toilet, bedpan or urinal? : A Lot  -   Putting on and taking off regular upper body clothing?: A Lot  -   Taking care of personal grooming such as brushing teeth?: A Lot  -   Eating meals?: A Little    AM-PAC Score:  Score: 13  Approx Degree of Impairment: 63.03%  Standardized Score (AM-PAC Scale): 32.03  CMS Modifier (G-Code): CL    FUNCTIONAL ADL ASSESSMENT  Eating: min/setup assist  Grooming: mod assist  UB Dressing: mod assist  LB Dressing: max assist  Toileting: total assist    Skilled Therapy Provided: OT orders received and chart reviewed. RN contacted prior to start of care. Treatment coordinated w/ PT. Pt agreeable to participation in therapy. Gait belt used during dynamic activity. Pt received in bed, alert and oriented to self. PT assisting w/ [place] translation (pt VERY [place] making conversation very difficult) On initial contact pt required max a x 2 to transfer supine<>sit at eob. Pt w/ slight left  lateral lean requiring physical assist to correct. Pt required max a x 2 for spt bed<>chair. Pt incontinent and required total assist for holden care and clothing management. Pt required max a for sit<>stand from bedside chair. Pt required mod/max a to maintain static standing. Family in at end of session and providing information regarding plf    At end of session pt remaining up in chair w/ all needs in reach and alarm on;family member at bedside. RN aware of pt's status and performance in therapy      EDUCATION PROVIDED  Patient Education : Fall Prevention; Functional Transfer Techniques  Patient's Response to Education: Requires Further Education    The patient's Approx Degree of Impairment: 63.03% has been calculated based on documentation in the [facility] '6 clicks' Inpatient Daily Activity Short Form.  Research supports that patients with this level of impairment may benefit from IRF.  Final disposition will be made by interdisciplinary medical team.     Patient End of Session: Up in chair, Call light within reach, Alarm set, Family present    OT Goals  Patients self stated goal is: unstated     Patient will complete functional transfer with min a  Comment:     Patient will complete toileting with min a  Comment:     Patient will tolerate standing for 3 minutes in prep for adls with rw and cga   Comment:    Patient will transfer supine<>sit at eob with min a  Comment:          Goals  on: 25  Frequency:sx/week     Patient Evaluation Complexity Level:   Occupational Profile/Medical History MODERATE - Expanded review of history including review of medical or therapy record   Specific performance deficits impacting engagement in ADL/IADL MODERATE  3 - 5 performance deficits   Client Assessment/Performance Deficits MODERATE - Comorbidities and min to mod modifications of tasks    Clinical Decision Making MODERATE - Analysis of occupational profile, detailed assessments, several treatment options    Overall  Complexity MODERATE     Therapeutic Activity: 23 minutes

## 2025-06-17 NOTE — PLAN OF CARE
Problem: Patient Centered Care  Goal: Patient preferences are identified and integrated in the patient's plan of care  Description: Interventions:  - What would you like us to know as we care for you?   - Provide timely, complete, and accurate information to patient/family  - Incorporate patient and family knowledge, values, beliefs, and cultural backgrounds into the planning and delivery of care  - Encourage patient/family to participate in care and decision-making at the level they choose  - Honor patient and family perspectives and choices  Outcome: Progressing     Problem: Diabetes/Glucose Control  Goal: Glucose maintained within prescribed range  Description: INTERVENTIONS:  - Monitor Blood Glucose as ordered  - Assess for signs and symptoms of hyperglycemia and hypoglycemia  - Administer ordered medications to maintain glucose within target range  - Assess barriers to adequate nutritional intake and initiate nutrition consult as needed  - Instruct patient on self management of diabetes  Outcome: Progressing     Problem: Patient/Family Goals  Goal: Patient/Family Long Term Goal  Description: Patient's Long Term Goal:     Interventions:  -   - See additional Care Plan goals for specific interventions  Outcome: Progressing  Goal: Patient/Family Short Term Goal  Description: Patient's Short Term Goal:     Interventions:   -   - See additional Care Plan goals for specific interventions  Outcome: Progressing     Problem: SAFETY ADULT - FALL  Goal: Free from fall injury  Description: INTERVENTIONS:  - Assess pt frequently for physical needs  - Identify cognitive and physical deficits and behaviors that affect risk of falls.  - [place] fall precautions as indicated by assessment.  - Educate pt/family on patient safety including physical limitations  - Instruct pt to call for assistance with activity based on assessment  - Modify environment to reduce risk of injury  - Provide assistive devices as appropriate  -  Consider OT/PT consult to assist with strengthening/mobility  - Encourage toileting schedule  Outcome: Progressing     Problem: DISCHARGE PLANNING  Goal: Discharge to home or other facility with appropriate resources  Description: INTERVENTIONS:  - Identify barriers to discharge w/pt and caregiver  - Include patient/family/discharge partner in discharge planning  - Arrange for needed discharge resources and transportation as appropriate  - Identify discharge learning needs (meds, wound care, etc)  - Arrange for interpreters to assist at discharge as needed  - Consider post-discharge preferences of patient/family/discharge partner  - Complete POLST form as appropriate  - Assess patient's ability to be responsible for managing their own health  - Refer to Case Management Department for coordinating discharge planning if the patient needs post-hospital services based on physician/LIP order or complex needs related to functional status, cognitive ability or social support system  Outcome: Progressing

## 2025-06-17 NOTE — ED QUICK NOTES
Orders for admission, patient is aware of plan and ready to go upstairs. Any questions, please call ED RN chris at extension [phone].     Patient Covid vaccination status: Unvaccinated     COVID Test Ordered in ED: None    COVID Suspicion at Admission: N/A    Running Infusions: Medication Infusions[1] None    Mental Status/LOC at time of transport: Aox3     Other pertinent information: mainly Micronesian speaking  CIWA score: N/A   NIH score:  N/A             [1]

## 2025-06-18 LAB
ANION GAP SERPL CALC-SCNC: 9 MMOL/L (ref 0–18)
BASOPHILS # BLD AUTO: 0.04 X10(3) UL (ref 0–0.2)
BASOPHILS NFR BLD AUTO: 0.4 %
BUN BLD-MCNC: 37 MG/DL (ref 9–23)
BUN/CREAT SERPL: 15.5 (ref 10–20)
CALCIUM BLD-MCNC: 7.9 MG/DL (ref 8.7–10.4)
CHLORIDE SERPL-SCNC: 105 MMOL/L (ref 98–112)
CO2 SERPL-SCNC: 26 MMOL/L (ref 21–32)
CREAT BLD-MCNC: 2.38 MG/DL (ref 0.55–1.02)
DEPRECATED RDW RBC AUTO: 49.8 FL (ref 35.1–46.3)
EGFRCR SERPLBLD CKD-EPI 2021: 18 ML/MIN/1.73M2 (ref 60–?)
EOSINOPHIL # BLD AUTO: 0.07 X10(3) UL (ref 0–0.7)
EOSINOPHIL NFR BLD AUTO: 0.8 %
ERYTHROCYTE [DISTWIDTH] IN BLOOD BY AUTOMATED COUNT: 17.8 % (ref 11–15)
GLUCOSE BLD-MCNC: 66 MG/DL (ref 70–99)
GLUCOSE BLDC GLUCOMTR-MCNC: 115 MG/DL (ref 70–99)
GLUCOSE BLDC GLUCOMTR-MCNC: 121 MG/DL (ref 70–99)
GLUCOSE BLDC GLUCOMTR-MCNC: 172 MG/DL (ref 70–99)
GLUCOSE BLDC GLUCOMTR-MCNC: 205 MG/DL (ref 70–99)
GLUCOSE BLDC GLUCOMTR-MCNC: 232 MG/DL (ref 70–99)
GLUCOSE BLDC GLUCOMTR-MCNC: 60 MG/DL (ref 70–99)
HCT VFR BLD AUTO: 32.6 % (ref 35–48)
HGB BLD-MCNC: 9.8 G/DL (ref 12–16)
IMM GRANULOCYTES # BLD AUTO: 0.03 X10(3) UL (ref 0–1)
IMM GRANULOCYTES NFR BLD: 0.3 %
LYMPHOCYTES # BLD AUTO: 1.14 X10(3) UL (ref 1–4)
LYMPHOCYTES NFR BLD AUTO: 12.7 %
MCH RBC QN AUTO: 24 PG (ref 26–34)
MCHC RBC AUTO-ENTMCNC: 30.1 G/DL (ref 31–37)
MCV RBC AUTO: 79.7 FL (ref 80–100)
MONOCYTES # BLD AUTO: 0.51 X10(3) UL (ref 0.1–1)
MONOCYTES NFR BLD AUTO: 5.7 %
NEUTROPHILS # BLD AUTO: 7.21 X10 (3) UL (ref 1.5–7.7)
NEUTROPHILS # BLD AUTO: 7.21 X10(3) UL (ref 1.5–7.7)
NEUTROPHILS NFR BLD AUTO: 80.1 %
OSMOLALITY SERPL CALC.SUM OF ELEC: 297 MOSM/KG (ref 275–295)
PLATELET # BLD AUTO: 175 10(3)UL (ref 150–450)
POTASSIUM SERPL-SCNC: 3.3 MMOL/L (ref 3.5–5.1)
RBC # BLD AUTO: 4.09 X10(6)UL (ref 3.8–5.3)
SODIUM SERPL-SCNC: 140 MMOL/L (ref 136–145)
T3FREE SERPL-MCNC: 1.34 PG/ML (ref 2.4–4.2)
T4 FREE SERPL-MCNC: 1.2 NG/DL (ref 0.8–1.7)
TSI SER-ACNC: 9.44 UIU/ML (ref 0.55–4.78)
WBC # BLD AUTO: 9 X10(3) UL (ref 4–11)

## 2025-06-18 PROCEDURE — 99233 SBSQ HOSP IP/OBS HIGH 50: CPT | Performed by: HOSPITALIST

## 2025-06-18 NOTE — PLAN OF CARE
Problem: Patient Centered Care  Goal: Patient preferences are identified and integrated in the patient's plan of care  Description: Interventions:  - What would you like us to know as we care for you? From home alone with caregiver for part of the day.  - Provide timely, complete, and accurate information to patient/family  - Incorporate patient and family knowledge, values, beliefs, and cultural backgrounds into the planning and delivery of care  - Encourage patient/family to participate in care and decision-making at the level they choose  - Honor patient and family perspectives and choices  Outcome: Progressing     Problem: Diabetes/Glucose Control  Goal: Glucose maintained within prescribed range  Description: INTERVENTIONS:  - Monitor Blood Glucose as ordered  - Assess for signs and symptoms of hyperglycemia and hypoglycemia  - Administer ordered medications to maintain glucose within target range  - Assess barriers to adequate nutritional intake and initiate nutrition consult as needed  - Instruct patient on self management of diabetes  Outcome: Progressing     Problem: SAFETY ADULT - FALL  Goal: Free from fall injury  Description: INTERVENTIONS:  - Assess pt frequently for physical needs  - Identify cognitive and physical deficits and behaviors that affect risk of falls.  - [facility] fall precautions as indicated by assessment.  - Educate pt/family on patient safety including physical limitations  - Instruct pt to call for assistance with activity based on assessment  - Modify environment to reduce risk of injury  - Provide assistive devices as appropriate  - Consider OT/PT consult to assist with strengthening/mobility  - Encourage toileting schedule  Outcome: Progressing     Problem: DISCHARGE PLANNING  Goal: Discharge to home or other facility with appropriate resources  Description: INTERVENTIONS:  - Identify barriers to discharge w/pt and caregiver  - Include patient/family/discharge partner in  discharge planning  - Arrange for needed discharge resources and transportation as appropriate  - Identify discharge learning needs (meds, wound care, etc)  - Arrange for interpreters to assist at discharge as needed  - Consider post-discharge preferences of patient/family/discharge partner  - Complete POLST form as appropriate  - Assess patient's ability to be responsible for managing their own health  - Refer to Case Management Department for coordinating discharge planning if the patient needs post-hospital services based on physician/LIP order or complex needs related to functional status, cognitive ability or social support system  Outcome: Progressing     Problem: Altered Communication/Language Barrier  Goal: Patient/Family is able to understand and participate in their care  Description: Interventions:  - Assess communication ability and preferred communication style  - Implement communication aides and strategies  - Use visual cues when possible  - Listen attentively, be patient, do not interrupt  - Minimize distractions  - Allow time for understanding and response  - Establish method for patient to ask for assistance (call light)  - Provide an  as needed  - Communicate barriers and strategies to overcome with those who interact with patient  Outcome: Progressing     Problem: METABOLIC/FLUID AND ELECTROLYTES - ADULT  Goal: Glucose maintained within prescribed range  Description: INTERVENTIONS:  - Monitor Blood Glucose as ordered  - Assess for signs and symptoms of hyperglycemia and hypoglycemia  - Administer ordered medications to maintain glucose within target range  - Assess barriers to adequate nutritional intake and initiate nutrition consult as needed  - Instruct patient on self management of diabetes  Outcome: Progressing  Goal: Electrolytes maintained within normal limits  Description: INTERVENTIONS:  - Monitor labs and rhythm and assess patient for signs and symptoms of electrolyte  imbalances  - Administer electrolyte replacement as ordered  - Monitor response to electrolyte replacements, including rhythm and repeat lab results as appropriate  - Fluid restriction as ordered  - Instruct patient on fluid and nutrition restrictions as appropriate  Outcome: Progressing  Goal: Hemodynamic stability and optimal renal function maintained  Description: INTERVENTIONS:  - Monitor labs and assess for signs and symptoms of volume excess or deficit  - Monitor intake, output and patient weight  - Monitor urine specific gravity, serum osmolarity and serum sodium as indicated or ordered  - Monitor response to interventions for patient's volume status, including labs, urine output, blood pressure (other measures as available)  - Encourage oral intake as appropriate  - Instruct patient on fluid and nutrition restrictions as appropriate  Outcome: Progressing     Problem: Impaired Activities of Daily Living  Goal: Achieve highest/safest level of independence in self care  Description: Interventions:  - Assess ability and encourage patient to participate in ADLs to maximize function  - Promote sitting position while performing ADLs such as feeding, grooming, and bathing  - Educate and encourage patient/family in tolerated functional activity level and precautions during self-care    Outcome: Progressing     Problem: Impaired Cognition  Goal: Patient will exhibit improved attention, thought processing and/or memory  Description: Interventions:    Outcome: Progressing

## 2025-06-18 NOTE — CM/SW NOTE
06/18/25 1000   CM/SW Referral Data   Referral Source Physician   Reason for Referral Discharge planning   Informant EMR;Clinical Staff Member;HCPOA/Surrogate   Medical Hx   Does patient have an established PCP? Yes  (Dr. Nikko Abrams)   Patient Info   Advanced directives? No   Patient's Current Mental Status at Time of Assessment Confused or unable to complete assessment   Patient Communication Issues Language barrier   Patient's Home Environment House   Number of Levels in Home 1   Number of Stair in Home 1 ARACELI   Patient lives with Alone   Patient Status Prior to Admission   Independent with ADLs and Mobility No   Pt. requires assistance with Housework;Driving;Ambulating;Medications   Services in place prior to admission Home Health Care;DME/Supplies at home;long term Home Care   Home Health Provider Info [facility]   Type of DME/Supplies Straight Cane   long term Home Care Provider Department on Aging   long term Care hours per day 162.5 per month   Discharge Needs   Anticipated D/C needs Subacute rehab   Services Requested   Submitted to [facility] Yes   PASRR Level 1 Submitted Yes   PMR Consult Requested Not clinically appropriate at this time   Choice of Post-Acute Provider   Informed patient of right to choose their preferred provider Yes   List of appropriate post-acute services provided to patient/family with quality data Yes     SW received MD order for discharge planning.    Pt admitted observation for dehydration/renal failure.    Anticipated therapy need: Gradual Rehabilitative Therapy.     SW sent [facility] referrals in Aidin.  [facility] approved/PASRR and DON completed and uploaded to referral.    Per EMR, pt is from home alone with part-time CG during the day.  Pt is ambulatory with a cane.    SW confirmed with DCSS that pt has 162.5 hours per month from [facility].    Pt is active with [facility]- clinicals sent in Aidin.    Pt has PMH of dementia- SW spoke to next of kin Beau to  discuss rehab.    Beau to come to [facility] today along with pt's nephew Edgar.  [facility] average LOS explained.    List left bedside with family's permission.    PLAN: DC to [facility] pending choice/med clear    / to remain available for support and/or discharge planning.     Estela MANRIQUE, LSW  Discharge Planner

## 2025-06-18 NOTE — PROGRESS NOTES
[facility]  part of [facility]    Progress Note    Karissa Hinds Patient Status:  Observation    1931 MRN [id number]   Location [facility] 5SW/SE Attending Herman Jeter,*   Hosp Day # 0 PCP Nikko Abrams     Chief Complaint: ok    Subjective:     Unable to perform ROS: Dementia       Objective:   Blood pressure 147/68, pulse 87, temperature 98.7 °F (37.1 °C), temperature source Oral, resp. rate 18, weight 113 lb 15.7 oz (51.7 kg), SpO2 97%.  Physical Exam  Constitutional:       General: She is not in acute distress.     Appearance: She is ill-appearing. She is not toxic-appearing or diaphoretic.   HENT:      Head: Normocephalic.   Cardiovascular:      Rate and Rhythm: Normal rate.      Pulses: Normal pulses.   Pulmonary:      Breath sounds: Rhonchi present. No wheezing.   Abdominal:      General: Bowel sounds are normal.      Palpations: Abdomen is soft.      Tenderness: There is no abdominal tenderness.   Skin:     General: Skin is warm and dry.      Capillary Refill: Capillary refill takes less than 2 seconds.   Neurological:      General: No focal deficit present.      Mental Status: She is alert and oriented to person, place, and time.   Psychiatric:         Behavior: Behavior normal.         Results:   Lab Results   Component Value Date    WBC 9.0 2025    HGB 9.8 (L) 2025    HCT 32.6 (L) 2025    .0 2025    CREATSERUM 2.38 (H) 2025    BUN 37 (H) 2025     2025    K 3.3 (L) 2025     2025    CO2 26.0 2025    GLU 66 (L) 2025    CA 7.9 (L) 2025       No results found.        Assessment & Plan:     Possible dehydration  Reported hypotension at home  Renal insufficiency, unknown chronicity.  Last creatinine 1.05, 2 years ago  -Place in observation  - Hydrate and monitor  - Further workup as appropriate     DM 2  - Hold oral hypoglycemics  - Cover with insulin     HTN  - Home meds  as appropriate     Uti ecoli  - Follow-up cultures     History of dementia  - Resume home meds as appropriate     Advanced age  -PT/OT  - Family to meet and discuss placement options  - Social work consult        Quality:  DVT Prophylaxis: Heparin  CODE status: Unknown  JUDITH: when cx back and eloy picked  Patient will require inpatient services that will reasonably be expected to span two midnight's based on the clinical documentation in H+P.   Based on patients current state of illness, I anticipate that, after discharge, patient will require TBD.  Complex mdm; coordinating care with nurse and counseling pts family in room about needing eloy; uti; counseling essential for care and no meaningful way to get consent    BRIONNA LIU MD

## 2025-06-18 NOTE — PLAN OF CARE
Problem: Patient Centered Care  Goal: Patient preferences are identified and integrated in the patient's plan of care  Description: Interventions:  - What would you like us to know as we care for you? From home alone with caregiver for part of the day.  - Provide timely, complete, and accurate information to patient/family  - Incorporate patient and family knowledge, values, beliefs, and cultural backgrounds into the planning and delivery of care  - Encourage patient/family to participate in care and decision-making at the level they choose  - Honor patient and family perspectives and choices  Outcome: Progressing     Problem: Diabetes/Glucose Control  Goal: Glucose maintained within prescribed range  Description: INTERVENTIONS:  - Monitor Blood Glucose as ordered  - Assess for signs and symptoms of hyperglycemia and hypoglycemia  - Administer ordered medications to maintain glucose within target range  - Assess barriers to adequate nutritional intake and initiate nutrition consult as needed  - Instruct patient on self management of diabetes  Outcome: Progressing     Problem: Patient/Family Goals  Goal: Patient/Family Long Term Goal  Description: Patient's Long Term Goal:     Interventions:  -   - See additional Care Plan goals for specific interventions  Outcome: Progressing  Goal: Patient/Family Short Term Goal  Description: Patient's Short Term Goal:     Interventions:   -   - See additional Care Plan goals for specific interventions  Outcome: Progressing     Problem: SAFETY ADULT - FALL  Goal: Free from fall injury  Description: INTERVENTIONS:  - Assess pt frequently for physical needs  - Identify cognitive and physical deficits and behaviors that affect risk of falls.  - [place] fall precautions as indicated by assessment.  - Educate pt/family on patient safety including physical limitations  - Instruct pt to call for assistance with activity based on assessment  - Modify environment to reduce risk of  injury  - Provide assistive devices as appropriate  - Consider OT/PT consult to assist with strengthening/mobility  - Encourage toileting schedule  Outcome: Progressing     Problem: DISCHARGE PLANNING  Goal: Discharge to home or other facility with appropriate resources  Description: INTERVENTIONS:  - Identify barriers to discharge w/pt and caregiver  - Include patient/family/discharge partner in discharge planning  - Arrange for needed discharge resources and transportation as appropriate  - Identify discharge learning needs (meds, wound care, etc)  - Arrange for interpreters to assist at discharge as needed  - Consider post-discharge preferences of patient/family/discharge partner  - Complete POLST form as appropriate  - Assess patient's ability to be responsible for managing their own health  - Refer to Case Management Department for coordinating discharge planning if the patient needs post-hospital services based on physician/LIP order or complex needs related to functional status, cognitive ability or social support system  Outcome: Progressing     Problem: Altered Communication/Language Barrier  Goal: Patient/Family is able to understand and participate in their care  Description: Interventions:  - Assess communication ability and preferred communication style  - Implement communication aides and strategies  - Use visual cues when possible  - Listen attentively, be patient, do not interrupt  - Minimize distractions  - Allow time for understanding and response  - Establish method for patient to ask for assistance (call light)  - Provide an  as needed  - Communicate barriers and strategies to overcome with those who interact with patient  Outcome: Progressing     Problem: METABOLIC/FLUID AND ELECTROLYTES - ADULT  Goal: Glucose maintained within prescribed range  Description: INTERVENTIONS:  - Monitor Blood Glucose as ordered  - Assess for signs and symptoms of hyperglycemia and hypoglycemia  -  Administer ordered medications to maintain glucose within target range  - Assess barriers to adequate nutritional intake and initiate nutrition consult as needed  - Instruct patient on self management of diabetes  Outcome: Progressing  Goal: Electrolytes maintained within normal limits  Description: INTERVENTIONS:  - Monitor labs and rhythm and assess patient for signs and symptoms of electrolyte imbalances  - Administer electrolyte replacement as ordered  - Monitor response to electrolyte replacements, including rhythm and repeat lab results as appropriate  - Fluid restriction as ordered  - Instruct patient on fluid and nutrition restrictions as appropriate  Outcome: Progressing  Goal: Hemodynamic stability and optimal renal function maintained  Description: INTERVENTIONS:  - Monitor labs and assess for signs and symptoms of volume excess or deficit  - Monitor intake, output and patient weight  - Monitor urine specific gravity, serum osmolarity and serum sodium as indicated or ordered  - Monitor response to interventions for patient's volume status, including labs, urine output, blood pressure (other measures as available)  - Encourage oral intake as appropriate  - Instruct patient on fluid and nutrition restrictions as appropriate  Outcome: Progressing     Problem: Impaired Activities of Daily Living  Goal: Achieve highest/safest level of independence in self care  Description: Interventions:  - Assess ability and encourage patient to participate in ADLs to maximize function  - Promote sitting position while performing ADLs such as feeding, grooming, and bathing  - Educate and encourage patient/family in tolerated functional activity level and precautions during self-care    Outcome: Progressing     Problem: Impaired Cognition  Goal: Patient will exhibit improved attention, thought processing and/or memory  Description: Interventions:    Outcome: Progressing

## 2025-06-18 NOTE — CONGREGATE LIVING REVIEW
Formerly [facility] Living Authorization    The [facility] Review Committee has reviewed this case and the patient IS APPROVED for discharge to a facility for Short Term Skilled once the following procedure is followed:     - The physician discharge instructions (contained within the TERESITA note for SNF) must inlcude the below appropriate and approved COVID instructions to the facility    For questions regarding CLRC approval process, please contact the CM assigned to the case.  For questions regarding RN discharge workflow, please contact the unit Clinical Leader.

## 2025-06-19 LAB
ANION GAP SERPL CALC-SCNC: 9 MMOL/L (ref 0–18)
BASOPHILS # BLD AUTO: 0.02 X10(3) UL (ref 0–0.2)
BASOPHILS NFR BLD AUTO: 0.2 %
BUN BLD-MCNC: 29 MG/DL (ref 9–23)
BUN/CREAT SERPL: 17.3 (ref 10–20)
CALCIUM BLD-MCNC: 6.9 MG/DL (ref 8.7–10.4)
CHLORIDE SERPL-SCNC: 111 MMOL/L (ref 98–112)
CO2 SERPL-SCNC: 25 MMOL/L (ref 21–32)
CREAT BLD-MCNC: 1.68 MG/DL (ref 0.55–1.02)
DEPRECATED RDW RBC AUTO: 50.2 FL (ref 35.1–46.3)
EGFRCR SERPLBLD CKD-EPI 2021: 28 ML/MIN/1.73M2 (ref 60–?)
EOSINOPHIL # BLD AUTO: 0.09 X10(3) UL (ref 0–0.7)
EOSINOPHIL NFR BLD AUTO: 1.1 %
ERYTHROCYTE [DISTWIDTH] IN BLOOD BY AUTOMATED COUNT: 17.7 % (ref 11–15)
GLUCOSE BLD-MCNC: 66 MG/DL (ref 70–99)
GLUCOSE BLDC GLUCOMTR-MCNC: 161 MG/DL (ref 70–99)
GLUCOSE BLDC GLUCOMTR-MCNC: 230 MG/DL (ref 70–99)
GLUCOSE BLDC GLUCOMTR-MCNC: 283 MG/DL (ref 70–99)
GLUCOSE BLDC GLUCOMTR-MCNC: 70 MG/DL (ref 70–99)
HCT VFR BLD AUTO: 29.1 % (ref 35–48)
HGB BLD-MCNC: 9 G/DL (ref 12–16)
IMM GRANULOCYTES # BLD AUTO: 0.03 X10(3) UL (ref 0–1)
IMM GRANULOCYTES NFR BLD: 0.4 %
LYMPHOCYTES # BLD AUTO: 0.75 X10(3) UL (ref 1–4)
LYMPHOCYTES NFR BLD AUTO: 9.1 %
MAGNESIUM SERPL-MCNC: 1.2 MG/DL (ref 1.6–2.6)
MCH RBC QN AUTO: 25.1 PG (ref 26–34)
MCHC RBC AUTO-ENTMCNC: 30.9 G/DL (ref 31–37)
MCV RBC AUTO: 81.1 FL (ref 80–100)
MONOCYTES # BLD AUTO: 0.44 X10(3) UL (ref 0.1–1)
MONOCYTES NFR BLD AUTO: 5.4 %
NEUTROPHILS # BLD AUTO: 6.88 X10 (3) UL (ref 1.5–7.7)
NEUTROPHILS # BLD AUTO: 6.88 X10(3) UL (ref 1.5–7.7)
NEUTROPHILS NFR BLD AUTO: 83.8 %
OSMOLALITY SERPL CALC.SUM OF ELEC: 304 MOSM/KG (ref 275–295)
PHOSPHATE SERPL-MCNC: 2.1 MG/DL (ref 2.4–5.1)
PLATELET # BLD AUTO: 164 10(3)UL (ref 150–450)
POTASSIUM SERPL-SCNC: 2.5 MMOL/L (ref 3.5–5.1)
POTASSIUM SERPL-SCNC: 4.1 MMOL/L (ref 3.5–5.1)
RBC # BLD AUTO: 3.59 X10(6)UL (ref 3.8–5.3)
SODIUM SERPL-SCNC: 145 MMOL/L (ref 136–145)
T PALLIDUM AB SER QL IA: NONREACTIVE
VIT B12 SERPL-MCNC: 313 PG/ML (ref 211–911)
WBC # BLD AUTO: 8.2 X10(3) UL (ref 4–11)

## 2025-06-19 PROCEDURE — 99233 SBSQ HOSP IP/OBS HIGH 50: CPT | Performed by: HOSPITALIST

## 2025-06-19 RX ORDER — INSULIN DEGLUDEC 100 U/ML
7 INJECTION, SOLUTION SUBCUTANEOUS ONCE
Status: COMPLETED | OUTPATIENT
Start: 2025-06-19 | End: 2025-06-19

## 2025-06-19 RX ORDER — MAGNESIUM SULFATE HEPTAHYDRATE 40 MG/ML
2 INJECTION, SOLUTION INTRAVENOUS ONCE
Status: COMPLETED | OUTPATIENT
Start: 2025-06-19 | End: 2025-06-19

## 2025-06-19 RX ORDER — POTASSIUM CHLORIDE 1500 MG/1
40 TABLET, EXTENDED RELEASE ORAL ONCE
Status: COMPLETED | OUTPATIENT
Start: 2025-06-19 | End: 2025-06-19

## 2025-06-19 NOTE — PLAN OF CARE
Problem: Patient Centered Care  Goal: Patient preferences are identified and integrated in the patient's plan of care  Description: Interventions:  - What would you like us to know as we care for you? From home alone with caregiver for part of the day.  - Provide timely, complete, and accurate information to patient/family  - Incorporate patient and family knowledge, values, beliefs, and cultural backgrounds into the planning and delivery of care  - Encourage patient/family to participate in care and decision-making at the level they choose  - Honor patient and family perspectives and choices  Outcome: Progressing     Problem: Diabetes/Glucose Control  Goal: Glucose maintained within prescribed range  Description: INTERVENTIONS:  - Monitor Blood Glucose as ordered  - Assess for signs and symptoms of hyperglycemia and hypoglycemia  - Administer ordered medications to maintain glucose within target range  - Assess barriers to adequate nutritional intake and initiate nutrition consult as needed  - Instruct patient on self management of diabetes  Outcome: Progressing     Problem: SAFETY ADULT - FALL  Goal: Free from fall injury  Description: INTERVENTIONS:  - Assess pt frequently for physical needs  - Identify cognitive and physical deficits and behaviors that affect risk of falls.  - [place] fall precautions as indicated by assessment.  - Educate pt/family on patient safety including physical limitations  - Instruct pt to call for assistance with activity based on assessment  - Modify environment to reduce risk of injury  - Provide assistive devices as appropriate  - Consider OT/PT consult to assist with strengthening/mobility  - Encourage toileting schedule  Outcome: Progressing     Problem: DISCHARGE PLANNING  Goal: Discharge to home or other facility with appropriate resources  Description: INTERVENTIONS:  - Identify barriers to discharge w/pt and caregiver  - Include patient/family/discharge partner in  discharge planning  - Arrange for needed discharge resources and transportation as appropriate  - Identify discharge learning needs (meds, wound care, etc)  - Arrange for interpreters to assist at discharge as needed  - Consider post-discharge preferences of patient/family/discharge partner  - Complete POLST form as appropriate  - Assess patient's ability to be responsible for managing their own health  - Refer to Case Management Department for coordinating discharge planning if the patient needs post-hospital services based on physician/LIP order or complex needs related to functional status, cognitive ability or social support system  Outcome: Progressing     Problem: DISCHARGE PLANNING  Goal: Discharge to home or other facility with appropriate resources  Description: INTERVENTIONS:  - Identify barriers to discharge w/pt and caregiver  - Include patient/family/discharge partner in discharge planning  - Arrange for needed discharge resources and transportation as appropriate  - Identify discharge learning needs (meds, wound care, etc)  - Arrange for interpreters to assist at discharge as needed  - Consider post-discharge preferences of patient/family/discharge partner  - Complete POLST form as appropriate  - Assess patient's ability to be responsible for managing their own health  - Refer to Case Management Department for coordinating discharge planning if the patient needs post-hospital services based on physician/LIP order or complex needs related to functional status, cognitive ability or social support system  Outcome: Progressing     Problem: Altered Communication/Language Barrier  Goal: Patient/Family is able to understand and participate in their care  Description: Interventions:  - Assess communication ability and preferred communication style  - Implement communication aides and strategies  - Use visual cues when possible  - Listen attentively, be patient, do not interrupt  - Minimize distractions  -  Allow time for understanding and response  - Establish method for patient to ask for assistance (call light)  - Provide an  as needed  - Communicate barriers and strategies to overcome with those who interact with patient  Outcome: Progressing     Problem: METABOLIC/FLUID AND ELECTROLYTES - ADULT  Goal: Glucose maintained within prescribed range  Description: INTERVENTIONS:  - Monitor Blood Glucose as ordered  - Assess for signs and symptoms of hyperglycemia and hypoglycemia  - Administer ordered medications to maintain glucose within target range  - Assess barriers to adequate nutritional intake and initiate nutrition consult as needed  - Instruct patient on self management of diabetes  Outcome: Progressing  Goal: Electrolytes maintained within normal limits  Description: INTERVENTIONS:  - Monitor labs and rhythm and assess patient for signs and symptoms of electrolyte imbalances  - Administer electrolyte replacement as ordered  - Monitor response to electrolyte replacements, including rhythm and repeat lab results as appropriate  - Fluid restriction as ordered  - Instruct patient on fluid and nutrition restrictions as appropriate  Outcome: Progressing  Goal: Hemodynamic stability and optimal renal function maintained  Description: INTERVENTIONS:  - Monitor labs and assess for signs and symptoms of volume excess or deficit  - Monitor intake, output and patient weight  - Monitor urine specific gravity, serum osmolarity and serum sodium as indicated or ordered  - Monitor response to interventions for patient's volume status, including labs, urine output, blood pressure (other measures as available)  - Encourage oral intake as appropriate  - Instruct patient on fluid and nutrition restrictions as appropriate  Outcome: Progressing     Problem: Impaired Activities of Daily Living  Goal: Achieve highest/safest level of independence in self care  Description: Interventions:  - Assess ability and encourage  patient to participate in ADLs to maximize function  - Promote sitting position while performing ADLs such as feeding, grooming, and bathing  - Educate and encourage patient/family in tolerated functional activity level and precautions during self-care  Outcome: Progressing     Problem: Impaired Cognition  Goal: Patient will exhibit improved attention, thought processing and/or memory  Description: Interventions:  Outcome: Progressing

## 2025-06-19 NOTE — PROGRESS NOTES
[facility] Pharmacy Dosing Service  Antibiotic Dosing    Karissa Hinds is a 89 year old for whom pharmacy was consulted to dose cefazolin (ANCEF) for treatment of UTI.    CrCl: estimated creatinine clearance is 16.6 mL/min (A) (based on SCr of 1.68 mg/dL (H)).    Actual Body Weight:   Wt Readings from Last 1 Encounters:   06/19/25 51.2 kg (112 lb 12.8 oz)    Ideal body weight: 52.4 kg (115 lb 8.3 oz)   Body mass index is 19.98 kg/m².    cefazolin (ANCEF) 1g q12h has been ordered per P&T approved protocol.    Thank you,   Delfina Torres, PharmD  6/19/2025  6:42 PM

## 2025-06-19 NOTE — PROGRESS NOTES
[facility]  part of [facility]    Progress Note    Karissa Hinds Patient Status:  Observation    1931 MRN [id number]   Location [facility] 5SW/SE Attending Herman Jeter,*   Hosp Day # 1 PCP Nikko Abrams     Chief Complaint: ok    Subjective:     Unable to perform ROS: Dementia       Objective:   Blood pressure 146/83, pulse 83, temperature 98.1 °F (36.7 °C), temperature source Oral, resp. rate 16, weight 112 lb 12.8 oz (51.2 kg), SpO2 96%.  Physical Exam  Constitutional:       General: She is not in acute distress.     Appearance: She is ill-appearing. She is not toxic-appearing or diaphoretic.   HENT:      Head: Normocephalic.   Cardiovascular:      Rate and Rhythm: Normal rate.      Pulses: Normal pulses.   Pulmonary:      Breath sounds: Rhonchi present. No wheezing.   Abdominal:      General: Bowel sounds are normal.      Palpations: Abdomen is soft.      Tenderness: There is no abdominal tenderness.   Skin:     General: Skin is warm and dry.      Capillary Refill: Capillary refill takes less than 2 seconds.   Neurological:      General: No focal deficit present.      Mental Status: She is alert and oriented to person, place, and time.   Psychiatric:         Behavior: Behavior normal.         Results:   Lab Results   Component Value Date    WBC 8.2 2025    HGB 9.0 (L) 2025    HCT 29.1 (L) 2025    .0 2025    CREATSERUM 1.68 (H) 2025    BUN 29 (H) 2025     2025    K 4.1 2025     2025    CO2 25.0 2025    GLU 66 (L) 2025    CA 6.9 (L) 2025    T4F 1.2 2025    TSH 9.443 (H) 2025    MG 1.2 (L) 2025    PHOS 2.1 (L) 2025    B12 313 2025       No results found.        Assessment & Plan:     Possible dehydration  Reported hypotension at home  Renal insufficiency, unknown chronicity.  Last creatinine 1.05, 2 years ago  -Place in observation  - Hydrate and  monitor  - Further workup as appropriate     DM 2  - Hold oral hypoglycemics  - Cover with insulin     HTN  - Home meds as appropriate     Uti ecoli  - Follow-up cultures     History of dementia  - Resume home meds as appropriate     Advanced age  -PT/OT  - Family to meet and discuss placement options  - Social work consult        Quality:  DVT Prophylaxis: Heparin  CODE status: Unknown  JUDITH: when cx back and eloy picked  Patient will require inpatient services that will reasonably be expected to span two midnight's based on the clinical documentation in H+P.   Based on patients current state of illness, I anticipate that, after discharge, patient will require TBD.  Complex mdm; coordinating care with nurse and counseling pts family in room about needing eloy with her permission    Long dw sw and relative; pt has no close family here; cousin benny is here and wishes to be her surrogate; no other relatives here are available and I do not believe she can decide for herself; benny seems to care for her deeply and is a physical therapist who understands her needs; he will pick eloy; her sisters and cousin who is a doctor in [place], [place], have deferred to benny; I signed the surrogacy form    BRIONNA LIU MD

## 2025-06-19 NOTE — CM/SW NOTE
Patient discussed in RN DC rounds. JHONATAN followed up with patient's family member Edgar- [phone] in regards to [facility] choice. Edgar is still narrowing down the choices. JHONATAN offered to have liaisons reached out to Edgar. Edgar is agreeable. JHONATAN sent message via [employer] with his phone number.     [place] Surrogacy Act order as follows:     1) the patient's guardian of the person;   2) the patient's spouse;   3) any adult son or daughter of the patient;   4) either parent of the patient;   5) any adult brother or sister of the patient;   6) any adult grandchild of the patient;   7) a close friend of the patient;   8) the patient's guardian of the estate.      208pm- JHONATAN was informed that Cousin has a choice, he wishes for [facility]. JHONATAN sent updated inpt order via [employer] to [facility]. Edgar asked for Mychart, JHONATAN sent him the My chart link to his phone. JHONATAN provided Surrogate form that MD needs to sign. Form placed in the chart    Plan: Pending medical clearance, DC to [facility], *Choice, *2MN, *Surrogate form signature    SW/MANI to remain available for support and/or discharge planning.     Karey MOSQUEDA, MSW, LSW   x [phone]

## 2025-06-20 LAB
ANION GAP SERPL CALC-SCNC: 8 MMOL/L (ref 0–18)
BASOPHILS # BLD AUTO: 0.03 X10(3) UL (ref 0–0.2)
BASOPHILS NFR BLD AUTO: 0.3 %
BUN BLD-MCNC: 25 MG/DL (ref 9–23)
BUN/CREAT SERPL: 16.1 (ref 10–20)
CALCIUM BLD-MCNC: 7.9 MG/DL (ref 8.7–10.4)
CHLORIDE SERPL-SCNC: 108 MMOL/L (ref 98–112)
CO2 SERPL-SCNC: 26 MMOL/L (ref 21–32)
CREAT BLD-MCNC: 1.55 MG/DL (ref 0.55–1.02)
DEPRECATED RDW RBC AUTO: 50.3 FL (ref 35.1–46.3)
EGFRCR SERPLBLD CKD-EPI 2021: 31 ML/MIN/1.73M2 (ref 60–?)
EOSINOPHIL # BLD AUTO: 0.1 X10(3) UL (ref 0–0.7)
EOSINOPHIL NFR BLD AUTO: 1 %
ERYTHROCYTE [DISTWIDTH] IN BLOOD BY AUTOMATED COUNT: 17.8 % (ref 11–15)
GLUCOSE BLD-MCNC: 59 MG/DL (ref 70–99)
GLUCOSE BLDC GLUCOMTR-MCNC: 176 MG/DL (ref 70–99)
GLUCOSE BLDC GLUCOMTR-MCNC: 190 MG/DL (ref 70–99)
GLUCOSE BLDC GLUCOMTR-MCNC: 87 MG/DL (ref 70–99)
HCT VFR BLD AUTO: 31.9 % (ref 35–48)
HGB BLD-MCNC: 9.8 G/DL (ref 12–16)
IMM GRANULOCYTES # BLD AUTO: 0.05 X10(3) UL (ref 0–1)
IMM GRANULOCYTES NFR BLD: 0.5 %
LYMPHOCYTES # BLD AUTO: 1.17 X10(3) UL (ref 1–4)
LYMPHOCYTES NFR BLD AUTO: 11.7 %
MAGNESIUM SERPL-MCNC: 1.8 MG/DL (ref 1.6–2.6)
MCH RBC QN AUTO: 24.7 PG (ref 26–34)
MCHC RBC AUTO-ENTMCNC: 30.7 G/DL (ref 31–37)
MCV RBC AUTO: 80.6 FL (ref 80–100)
MONOCYTES # BLD AUTO: 0.73 X10(3) UL (ref 0.1–1)
MONOCYTES NFR BLD AUTO: 7.3 %
NEUTROPHILS # BLD AUTO: 7.95 X10 (3) UL (ref 1.5–7.7)
NEUTROPHILS # BLD AUTO: 7.95 X10(3) UL (ref 1.5–7.7)
NEUTROPHILS NFR BLD AUTO: 79.2 %
OSMOLALITY SERPL CALC.SUM OF ELEC: 296 MOSM/KG (ref 275–295)
PHOSPHATE SERPL-MCNC: 1.8 MG/DL (ref 2.4–5.1)
PLATELET # BLD AUTO: 192 10(3)UL (ref 150–450)
POTASSIUM SERPL-SCNC: 3.2 MMOL/L (ref 3.5–5.1)
RBC # BLD AUTO: 3.96 X10(6)UL (ref 3.8–5.3)
SODIUM SERPL-SCNC: 142 MMOL/L (ref 136–145)
WBC # BLD AUTO: 10 X10(3) UL (ref 4–11)

## 2025-06-20 PROCEDURE — 99233 SBSQ HOSP IP/OBS HIGH 50: CPT | Performed by: HOSPITALIST

## 2025-06-20 RX ORDER — POTASSIUM CHLORIDE 1500 MG/1
40 TABLET, EXTENDED RELEASE ORAL ONCE
Status: COMPLETED | OUTPATIENT
Start: 2025-06-20 | End: 2025-06-20

## 2025-06-20 RX ORDER — INSULIN DEGLUDEC 100 U/ML
4 INJECTION, SOLUTION SUBCUTANEOUS NIGHTLY
Status: DISCONTINUED | OUTPATIENT
Start: 2025-06-20 | End: 2025-06-20

## 2025-06-20 NOTE — PLAN OF CARE
Problem: Patient Centered Care  Goal: Patient preferences are identified and integrated in the patient's plan of care  Description: Interventions:  - What would you like us to know as we care for you? From home alone with caregiver for part of the day.  - Provide timely, complete, and accurate information to patient/family  - Incorporate patient and family knowledge, values, beliefs, and cultural backgrounds into the planning and delivery of care  - Encourage patient/family to participate in care and decision-making at the level they choose  - Honor patient and family perspectives and choices  Outcome: Progressing     Problem: Diabetes/Glucose Control  Goal: Glucose maintained within prescribed range  Description: INTERVENTIONS:  - Monitor Blood Glucose as ordered  - Assess for signs and symptoms of hyperglycemia and hypoglycemia  - Administer ordered medications to maintain glucose within target range  - Assess barriers to adequate nutritional intake and initiate nutrition consult as needed  - Instruct patient on self management of diabetes  Outcome: Progressing     Problem: SAFETY ADULT - FALL  Goal: Free from fall injury  Description: INTERVENTIONS:  - Assess pt frequently for physical needs  - Identify cognitive and physical deficits and behaviors that affect risk of falls.  - [place] fall precautions as indicated by assessment.  - Educate pt/family on patient safety including physical limitations  - Instruct pt to call for assistance with activity based on assessment  - Modify environment to reduce risk of injury  - Provide assistive devices as appropriate  - Consider OT/PT consult to assist with strengthening/mobility  - Encourage toileting schedule  Outcome: Progressing     Problem: DISCHARGE PLANNING  Goal: Discharge to home or other facility with appropriate resources  Description: INTERVENTIONS:  - Identify barriers to discharge w/pt and caregiver  - Include patient/family/discharge partner in  discharge planning  - Arrange for needed discharge resources and transportation as appropriate  - Identify discharge learning needs (meds, wound care, etc)  - Arrange for interpreters to assist at discharge as needed  - Consider post-discharge preferences of patient/family/discharge partner  - Complete POLST form as appropriate  - Assess patient's ability to be responsible for managing their own health  - Refer to Case Management Department for coordinating discharge planning if the patient needs post-hospital services based on physician/LIP order or complex needs related to functional status, cognitive ability or social support system  Outcome: Progressing     Problem: Altered Communication/Language Barrier  Goal: Patient/Family is able to understand and participate in their care  Description: Interventions:  - Assess communication ability and preferred communication style  - Implement communication aides and strategies  - Use visual cues when possible  - Listen attentively, be patient, do not interrupt  - Minimize distractions  - Allow time for understanding and response  - Establish method for patient to ask for assistance (call light)  - Provide an  as needed  - Communicate barriers and strategies to overcome with those who interact with patient  Outcome: Progressing     Problem: METABOLIC/FLUID AND ELECTROLYTES - ADULT  Goal: Glucose maintained within prescribed range  Description: INTERVENTIONS:  - Monitor Blood Glucose as ordered  - Assess for signs and symptoms of hyperglycemia and hypoglycemia  - Administer ordered medications to maintain glucose within target range  - Assess barriers to adequate nutritional intake and initiate nutrition consult as needed  - Instruct patient on self management of diabetes  Outcome: Progressing  Goal: Electrolytes maintained within normal limits  Description: INTERVENTIONS:  - Monitor labs and rhythm and assess patient for signs and symptoms of electrolyte  imbalances  - Administer electrolyte replacement as ordered  - Monitor response to electrolyte replacements, including rhythm and repeat lab results as appropriate  - Fluid restriction as ordered  - Instruct patient on fluid and nutrition restrictions as appropriate  Outcome: Progressing  Goal: Hemodynamic stability and optimal renal function maintained  Description: INTERVENTIONS:  - Monitor labs and assess for signs and symptoms of volume excess or deficit  - Monitor intake, output and patient weight  - Monitor urine specific gravity, serum osmolarity and serum sodium as indicated or ordered  - Monitor response to interventions for patient's volume status, including labs, urine output, blood pressure (other measures as available)  - Encourage oral intake as appropriate  - Instruct patient on fluid and nutrition restrictions as appropriate  Outcome: Progressing     Problem: Impaired Activities of Daily Living  Goal: Achieve highest/safest level of independence in self care  Description: Interventions:  - Assess ability and encourage patient to participate in ADLs to maximize function  - Promote sitting position while performing ADLs such as feeding, grooming, and bathing  - Educate and encourage patient/family in tolerated functional activity level and precautions during self-care  Outcome: Progressing     Problem: Impaired Cognition  Goal: Patient will exhibit improved attention, thought processing and/or memory  Description: Interventions:  Outcome: Progressing

## 2025-06-20 NOTE — PHYSICAL THERAPY NOTE
PHYSICAL THERAPY TREATMENT NOTE - INPATIENT     Room Number: 536/536-A       Presenting Problem: dehydration, hypotension  Co-Morbidities : DM, Alzheimer's, HTN    Problem List  Principal Problem:    Acute renal failure superimposed on chronic kidney disease, unspecified acute renal failure type, unspecified CKD stage  Active Problems:    ARF (acute renal failure)    Anemia    Hyponatremia    Dehydration      PHYSICAL THERAPY ASSESSMENT   Patient demonstrates good  progress this session, goals  remain in progress.      Patient is requiring minimal assist and moderate assist as a result of the following impairments: decreased functional strength, decreased endurance/aerobic capacity, impaired sitting and standing balance, decreased muscular endurance, cognitive deficits (hx of Alzheimer's), and medical status.     Patient continues to function below baseline with bed mobility, transfers, gait, stair negotiation, and standing prolonged periods.  Next session anticipate patient to progress bed mobility, transfers, gait, and stair negotiation.  Physical Therapy will continue to follow patient for duration of hospitalization.    Patient continues to benefit from continued skilled PT services: to promote return to prior level of function and safety with continuous assistance and gradual rehabilitative therapy .    PLAN DURING HOSPITALIZATION  Nursing Mobility Recommendation : 1 Assist     PT Treatment Plan: Bed mobility, Body mechanics, Coordination, Endurance, Energy conservation, Gait training, Strengthening, Transfer training, Balance training  Frequency (Obs): 3-5x/week     SUBJECTIVE  Agreeable to activity    OBJECTIVE  Precautions:  needed, Hard of hearing, Bed/chair alarm    WEIGHT BEARING RESTRICTION       PAIN ASSESSMENT   Ratin          BALANCE  Static Sitting: Fair +  Dynamic Sitting: Fair  Static Standing: Fair -  Dynamic Standing: Poor +      AM-PAC '6-Clicks' INPATIENT SHORT FORM - BASIC  MOBILITY  How much difficulty does the patient currently have...  Patient Difficulty: Turning over in bed (including adjusting bedclothes, sheets and blankets)?: A Little   Patient Difficulty: Sitting down on and standing up from a chair with arms (e.g., wheelchair, bedside commode, etc.): A Little   Patient Difficulty: Moving from lying on back to sitting on the side of the bed?: A Little   How much help from another person does the patient currently need...   Help from Another: Moving to and from a bed to a chair (including a wheelchair)?: A Little   Help from Another: Need to walk in hospital room?: A Little   Help from Another: Climbing 3-5 steps with a railing?: A Lot     AM-PAC Score:  Raw Score: 17   Approx Degree of Impairment: 50.57%   Standardized Score (AM-PAC Scale): 42.13   CMS Modifier (G-Code): CK    FUNCTIONAL ABILITY STATUS  Functional Mobility/Gait Assessment  Gait Assistance: Minimum assistance (progressing to SBA)  Distance (ft): 80 ft x2  Assistive Device: Rolling walker  Pattern: Shuffle (slow pace, decreased step length)  Supine to Sit: moderate assist  Sit to Stand: minimal assist to RW  Stand Pivot: Min A     Skilled Therapy Provided: Pt rec'd in bed with nephew at bedside agreeable to therapy, identified by name and , gait belt donned for mobility. Coordinated session with OT to maximize pt outcomes. Nephew providing Czech translation per pt request. Pt required Mod A for bed mobility, Min A for STS/SPT transfers and Min A>CGA for ambulation with RW. Pt with gait progression this session, able to ambulate 80 ft x2 with RW, demos slow shuffled gait, improved mechanics and balance with increased distance. Pt assisted with toilet transfer with cues for hand placement on grab bars prior to sitting. Pt CGA for static standing balance for pericares. Pt amb back to chair, needs in reach, family present.     The patient's Approx Degree of Impairment: 50.57% has been calculated based on  documentation in the [facility] '6 clicks' Inpatient Daily Activity Short Form.  Research supports that patients with this level of impairment may benefit from rehab.  Final disposition will be made by interdisciplinary medical team.      Patient End of Session: Up in chair, Needs met, Call light within reach, RN aware of session/findings, Family present    CURRENT GOALS   Goals to be met by: 25  Patient Goal Patient's self-stated goal is: not formally stated   Goal #1 Patient is able to demonstrate supine - sit EOB @ level: supervision      Goal #1   Current Status  in progress   Goal #2 Patient is able to demonstrate transfers EOB to/from Chair/Wheelchair at assistance level: supervision with least restrictive device.      Goal #2  Current Status  in progress   Goal #3 Patient is able to ambulate >25 feet with assist device: least restrictive device at assistance level: supervision   Goal #3   Current Status  in progress   Goal #4 Patient will negotiate 1 stairs/one curb w/ assistive device and supervision   Goal #4   Current Status  NT   Goal #5 Patient to demonstrate independence with home activity/exercise instructions provided to patient in preparation for discharge.   Goal #5   Current Status  in progress   Goal #6     Goal #6  Current Status       Gait Trainin minutes  Therapeutic Activity: 11 minutes

## 2025-06-20 NOTE — PROGRESS NOTES
[facility]  part of [facility]    Progress Note    Karissa Hinds Patient Status:  Observation    1931 MRN [id number]   Location [facility] 5SW/SE Attending Herman Jeter,*   Hosp Day # 2 PCP Nikko Abrams     Chief Complaint: ok    Subjective:     Unable to perform ROS: Dementia       Objective:   Blood pressure 154/66, pulse 72, temperature 97.5 °F (36.4 °C), temperature source Oral, resp. rate 18, weight 112 lb 12.8 oz (51.2 kg), SpO2 97%.  Physical Exam  Constitutional:       General: She is not in acute distress.     Appearance: She is ill-appearing. She is not toxic-appearing or diaphoretic.   HENT:      Head: Normocephalic.   Cardiovascular:      Rate and Rhythm: Normal rate.      Pulses: Normal pulses.   Pulmonary:      Breath sounds: Rhonchi present. No wheezing.   Abdominal:      General: Bowel sounds are normal.      Palpations: Abdomen is soft.      Tenderness: There is no abdominal tenderness.   Skin:     General: Skin is warm and dry.      Capillary Refill: Capillary refill takes less than 2 seconds.   Neurological:      General: No focal deficit present.      Mental Status: She is alert and oriented to person, place, and time.   Psychiatric:         Behavior: Behavior normal.         Results:   Lab Results   Component Value Date    WBC 10.0 2025    HGB 9.8 (L) 2025    HCT 31.9 (L) 2025    .0 2025    CREATSERUM 1.55 (H) 2025    BUN 25 (H) 2025     2025    K 3.2 (L) 2025     2025    CO2 26.0 2025    GLU 59 (L) 2025    CA 7.9 (L) 2025    T4F 1.2 2025    TSH 9.443 (H) 2025    MG 1.8 2025    PHOS 1.8 (L) 2025    B12 313 2025       No results found.        Assessment & Plan:     Possible dehydration  Reported hypotension at home  Renal insufficiency, unknown chronicity.  Last creatinine 1.05, 2 years ago  -Place in observation  - Hydrate and  monitor  - Further workup as appropriate     DM 2  - Hold oral hypoglycemics  - Cover with insulin     HTN  - Home meds as appropriate     Uti ecoli  - Follow-up cultures sensitive     History of dementia  - Resume home meds as appropriate     Advanced age  -PT/OT  - Family to meet and discuss placement options  - Social work consult    Loose stools persit; no clear consensus on how much came out; per family member states quite copious; may be fro phos replacement; order cdiff        Quality:  DVT Prophylaxis: Heparin  CODE status: Unknown  JUDITH: when cx back and eloy picked  Patient will require inpatient services that will reasonably be expected to span two midnight's based on the clinical documentation in H+P.   Based on patients current state of illness, I anticipate that, after discharge, patient will require TBD.  Complex mdm; coordinating care with nurse and counseling pts family in room about needing eloy with her permission    Long dw sw and relative; pt has no close family here; cousin benny is here and wishes to be her surrogate; no other relatives here are available and I do not believe she can decide for herself; benny seems to care for her deeply and is a physical therapist who understands her needs; he will pick eloy; her sisters and cousin who is a doctor in [place], [place], have deferred to benny; I signed the surrogacy form    BRIONNA LIU MD

## 2025-06-20 NOTE — OCCUPATIONAL THERAPY NOTE
OCCUPATIONAL THERAPY TREATMENT NOTE - INPATIENT        Room Number: 536/536-A     Presenting Problem: acute renal failure    Problem List  Principal Problem:    Acute renal failure superimposed on chronic kidney disease, unspecified acute renal failure type, unspecified CKD stage  Active Problems:    ARF (acute renal failure)    Anemia    Hyponatremia    Dehydration      OCCUPATIONAL THERAPY ASSESSMENT   Patient demonstrates fair progress this session, goals remain in progress.    Patient is requiring contact guard assist, minimal assist, moderate assist, and maximum assist as a result of the following impairments: decreased functional strength, decreased endurance, impaired standing balance, decreased muscular endurance, and medical status.    Patient continues to function below baseline with toileting, bathing, lower body dressing, grooming, bed mobility, transfers, dynamic standing balance, and functional standing tolerance.  Next session anticipate patient to progress toileting, lower body dressing, bed mobility, and transfers.  Occupational Therapy will continue to follow patient for duration of hospitalization.    Patient continues to benefit from continued skilled OT services: to promote return to prior level of function and safety with continuous assistance and gradual rehabilitative therapy.     PLAN DURING HOSPITALIZATION  OT Device Recommendations: TBD  OT Treatment Plan: Balance activities, Energy conservation/work simplification techniques, ADL training, Functional transfer training, Endurance training, Patient/Family education, Patient/Family training, Compensatory technique education     SUBJECTIVE  \"I'm ready.\"     OBJECTIVE  Precautions:  needed, Hard of hearing, Bed/chair alarm       PAIN ASSESSMENT  Ratin    ACTIVITIES OF DAILY LIVING ASSESSMENT  AM-PAC ‘6-Clicks’ Inpatient Daily Activity Short Form  How much help from another person does the patient currently need…  -   Putting on  and taking off regular lower body clothing?: A Lot  -   Bathing (including washing, rinsing, drying)?: A Lot  -   Toileting, which includes using toilet, bedpan or urinal? : A Lot  -   Putting on and taking off regular upper body clothing?: A Little  -   Taking care of personal grooming such as brushing teeth?: A Little  -   Eating meals?: A Little    AM-PAC Score:  Score: 15  Approx Degree of Impairment: 56.46%  Standardized Score (AM-PAC Scale): 34.69  CMS Modifier (G-Code): CK    FUNCTIONAL TRANSFER ASSESSMENT  Sit to Stand: Edge of Bed; Chair  Edge of Bed: Minimal Assist  Chair: Minimal Assist  Toilet Transfer: Moderate Assist    FUNCTIONAL MOBILITY  Pt required min assist with RW support to complete hallway distance mobility to simulate household distances.     BED MOBILITY  Supine to Sit : Moderate Assist    BALANCE ASSESSMENT  Static Sitting: Contact Guard Assist  Static Standing: Minimal Assist  Dynamic sitting: CGA  Dynamic Standing: Min assist      FUNCTIONAL ADL ASSESSMENT  LB Dressing Seated: Maximum Assist  LB Dressing Standing: Maximum Assist  Toileting Standing: Maximum Assist    Skilled Therapy Provided:   RN cleared pt for participation. Session coordinated with PT. Pt received in bed with nephew present and able to interpret. Pt agreeable to participation in therapy. Pt demo fair progress this session; progressing to min-mod assist for functional transfers, min assist for functional mobility, and mod assist for bed mobility. Pt continues to require max assist for LB ADLs (doffing soiled brief and donning clean one) and toileting holden cares. Pt demo increased activity tolerance this session; completing simulated household distance mobility with min assist and RW support. Pt with no LOB, SOB, or dizziness with activity. Pt required mod assist to complete STS from lower toilet height; benefited from use of bilateral grab bars during transfer. Pt remained in recliner with all needs in reach, alarm on,  and nephew present at end of session. RN aware.       EDUCATION PROVIDED  Patient Education : Role of Occupational Therapy; Plan of Care; Functional Transfer Techniques; Fall Prevention; Posture/Positioning; Energy Conservation; Proper Body Mechanics  Patient's Response to Education: Verbalized Understanding; Returned Demonstration  Family/Caregiver Education : Role of Occupational Therapy; Plan of Care  Family/Caregiver's Response to Education: Verbalized Understanding    The patient's Approx Degree of Impairment: 56.46% has been calculated based on documentation in the [facility] '6 clicks' Inpatient Daily Activity Short Form.  Research supports that patients with this level of impairment may benefit from rehab.  Final disposition will be made by interdisciplinary medical team.    Patient End of Session: Up in chair, Needs met, Call light within reach, RN aware of session/findings, All patient questions and concerns addressed, Hospital anti-slip socks, Alarm set, Family present    OT Goals:  Patients self stated goal is: unstated     Patient will complete functional transfer with min a  Comment: ongoing- mod assist    Patient will complete toileting with min a  Comment: ongoing- max assist      Patient will tolerate standing for 3 minutes in prep for adls with rw and cga   Comment: ongoing- min assist     Patient will transfer supine<>sit at eob with min a  Comment: ongoing- mod assist           Goals  on: 25  Frequency:sx/week   OT Session Time: 25 minutes  Self-Care Home Management: 10 minutes  Therapeutic Activity: 15 minutes

## 2025-06-20 NOTE — PLAN OF CARE
Problem: Patient Centered Care  Goal: Patient preferences are identified and integrated in the patient's plan of care  Description: Interventions:  - What would you like us to know as we care for you? From home alone with caregiver for part of the day.  - Provide timely, complete, and accurate information to patient/family  - Incorporate patient and family knowledge, values, beliefs, and cultural backgrounds into the planning and delivery of care  - Encourage patient/family to participate in care and decision-making at the level they choose  - Honor patient and family perspectives and choices  Outcome: Progressing     Problem: Diabetes/Glucose Control  Goal: Glucose maintained within prescribed range  Description: INTERVENTIONS:  - Monitor Blood Glucose as ordered  - Assess for signs and symptoms of hyperglycemia and hypoglycemia  - Administer ordered medications to maintain glucose within target range  - Assess barriers to adequate nutritional intake and initiate nutrition consult as needed  - Instruct patient on self management of diabetes  Outcome: Progressing    Problem: SAFETY ADULT - FALL  Goal: Free from fall injury  Description: INTERVENTIONS:  - Assess pt frequently for physical needs  - Identify cognitive and physical deficits and behaviors that affect risk of falls.  - [place] fall precautions as indicated by assessment.  - Educate pt/family on patient safety including physical limitations  - Instruct pt to call for assistance with activity based on assessment  - Modify environment to reduce risk of injury  - Provide assistive devices as appropriate  - Consider OT/PT consult to assist with strengthening/mobility  - Encourage toileting schedule  Outcome: Progressing     Problem: DISCHARGE PLANNING  Goal: Discharge to home or other facility with appropriate resources  Description: INTERVENTIONS:  - Identify barriers to discharge w/pt and caregiver  - Include patient/family/discharge partner in discharge  planning  - Arrange for needed discharge resources and transportation as appropriate  - Identify discharge learning needs (meds, wound care, etc)  - Arrange for interpreters to assist at discharge as needed  - Consider post-discharge preferences of patient/family/discharge partner  - Complete POLST form as appropriate  - Assess patient's ability to be responsible for managing their own health  - Refer to Case Management Department for coordinating discharge planning if the patient needs post-hospital services based on physician/LIP order or complex needs related to functional status, cognitive ability or social support system  Outcome: Progressing     Problem: Altered Communication/Language Barrier  Goal: Patient/Family is able to understand and participate in their care  Description: Interventions:  - Assess communication ability and preferred communication style  - Implement communication aides and strategies  - Use visual cues when possible  - Listen attentively, be patient, do not interrupt  - Minimize distractions  - Allow time for understanding and response  - Establish method for patient to ask for assistance (call light)  - Provide an  as needed  - Communicate barriers and strategies to overcome with those who interact with patient  Outcome: Progressing     Problem: METABOLIC/FLUID AND ELECTROLYTES - ADULT  Goal: Glucose maintained within prescribed range  Description: INTERVENTIONS:  - Monitor Blood Glucose as ordered  - Assess for signs and symptoms of hyperglycemia and hypoglycemia  - Administer ordered medications to maintain glucose within target range  - Assess barriers to adequate nutritional intake and initiate nutrition consult as needed  - Instruct patient on self management of diabetes  Outcome: Progressing  Goal: Electrolytes maintained within normal limits  Description: INTERVENTIONS:  - Monitor labs and rhythm and assess patient for signs and symptoms of electrolyte imbalances  -  Administer electrolyte replacement as ordered  - Monitor response to electrolyte replacements, including rhythm and repeat lab results as appropriate  - Fluid restriction as ordered  - Instruct patient on fluid and nutrition restrictions as appropriate  Outcome: Progressing  Goal: Hemodynamic stability and optimal renal function maintained  Description: INTERVENTIONS:  - Monitor labs and assess for signs and symptoms of volume excess or deficit  - Monitor intake, output and patient weight  - Monitor urine specific gravity, serum osmolarity and serum sodium as indicated or ordered  - Monitor response to interventions for patient's volume status, including labs, urine output, blood pressure (other measures as available)  - Encourage oral intake as appropriate  - Instruct patient on fluid and nutrition restrictions as appropriate  Outcome: Progressing     Problem: Impaired Activities of Daily Living  Goal: Achieve highest/safest level of independence in self care  Description: Interventions:  - Assess ability and encourage patient to participate in ADLs to maximize function  - Promote sitting position while performing ADLs such as feeding, grooming, and bathing  - Educate and encourage patient/family in tolerated functional activity level and precautions during self-care  - Provide support under elbow of weak side to prevent shoulder subluxation  Outcome: Progressing     Problem: Impaired Cognition  Goal: Patient will exhibit improved attention, thought processing and/or memory  Description: Interventions:  - Minimize distractions in the room when full attention is required  Outcome: Progressing

## 2025-06-21 VITALS
RESPIRATION RATE: 18 BRPM | WEIGHT: 113.19 LBS | HEART RATE: 88 BPM | DIASTOLIC BLOOD PRESSURE: 70 MMHG | BODY MASS INDEX: 20 KG/M2 | OXYGEN SATURATION: 95 % | SYSTOLIC BLOOD PRESSURE: 147 MMHG | TEMPERATURE: 99 F

## 2025-06-21 LAB
ANION GAP SERPL CALC-SCNC: 7 MMOL/L (ref 0–18)
BASOPHILS # BLD AUTO: 0.05 X10(3) UL (ref 0–0.2)
BASOPHILS NFR BLD AUTO: 0.6 %
BUN BLD-MCNC: 21 MG/DL (ref 9–23)
BUN/CREAT SERPL: 13.5 (ref 10–20)
CALCIUM BLD-MCNC: 7.9 MG/DL (ref 8.7–10.4)
CHLORIDE SERPL-SCNC: 111 MMOL/L (ref 98–112)
CO2 SERPL-SCNC: 25 MMOL/L (ref 21–32)
CREAT BLD-MCNC: 1.55 MG/DL (ref 0.55–1.02)
DEPRECATED RDW RBC AUTO: 51.9 FL (ref 35.1–46.3)
EGFRCR SERPLBLD CKD-EPI 2021: 31 ML/MIN/1.73M2 (ref 60–?)
EOSINOPHIL # BLD AUTO: 0.13 X10(3) UL (ref 0–0.7)
EOSINOPHIL NFR BLD AUTO: 1.6 %
ERYTHROCYTE [DISTWIDTH] IN BLOOD BY AUTOMATED COUNT: 18.3 % (ref 11–15)
GLUCOSE BLD-MCNC: 145 MG/DL (ref 70–99)
GLUCOSE BLDC GLUCOMTR-MCNC: 162 MG/DL (ref 70–99)
GLUCOSE BLDC GLUCOMTR-MCNC: 181 MG/DL (ref 70–99)
GLUCOSE BLDC GLUCOMTR-MCNC: 270 MG/DL (ref 70–99)
HCT VFR BLD AUTO: 33.1 % (ref 35–48)
HGB BLD-MCNC: 9.9 G/DL (ref 12–16)
IMM GRANULOCYTES # BLD AUTO: 0.04 X10(3) UL (ref 0–1)
IMM GRANULOCYTES NFR BLD: 0.5 %
LYMPHOCYTES # BLD AUTO: 1.14 X10(3) UL (ref 1–4)
LYMPHOCYTES NFR BLD AUTO: 14.4 %
MAGNESIUM SERPL-MCNC: 1.5 MG/DL (ref 1.6–2.6)
MCH RBC QN AUTO: 24.4 PG (ref 26–34)
MCHC RBC AUTO-ENTMCNC: 29.9 G/DL (ref 31–37)
MCV RBC AUTO: 81.7 FL (ref 80–100)
MONOCYTES # BLD AUTO: 0.67 X10(3) UL (ref 0.1–1)
MONOCYTES NFR BLD AUTO: 8.4 %
NEUTROPHILS # BLD AUTO: 5.91 X10 (3) UL (ref 1.5–7.7)
NEUTROPHILS # BLD AUTO: 5.91 X10(3) UL (ref 1.5–7.7)
NEUTROPHILS NFR BLD AUTO: 74.5 %
OSMOLALITY SERPL CALC.SUM OF ELEC: 302 MOSM/KG (ref 275–295)
PHOSPHATE SERPL-MCNC: 2.2 MG/DL (ref 2.4–5.1)
PLATELET # BLD AUTO: 195 10(3)UL (ref 150–450)
POTASSIUM SERPL-SCNC: 4.2 MMOL/L (ref 3.5–5.1)
RBC # BLD AUTO: 4.05 X10(6)UL (ref 3.8–5.3)
SODIUM SERPL-SCNC: 143 MMOL/L (ref 136–145)
WBC # BLD AUTO: 7.9 X10(3) UL (ref 4–11)

## 2025-06-21 PROCEDURE — 99239 HOSP IP/OBS DSCHRG MGMT >30: CPT | Performed by: HOSPITALIST

## 2025-06-21 RX ORDER — MELATONIN
1000 DAILY
Qty: 30 TABLET | Refills: 0 | Status: SHIPPED | OUTPATIENT
Start: 2025-06-21

## 2025-06-21 RX ORDER — EMPAGLIFLOZIN 10 MG/1
10 TABLET, FILM COATED ORAL DAILY
Qty: 30 TABLET | Refills: 0 | Status: SHIPPED | OUTPATIENT
Start: 2025-06-21

## 2025-06-21 RX ORDER — CEPHALEXIN 500 MG/1
500 CAPSULE ORAL 2 TIMES DAILY
Qty: 5 CAPSULE | Refills: 0 | Status: SHIPPED | OUTPATIENT
Start: 2025-06-21 | End: 2025-06-24

## 2025-06-21 RX ORDER — LEVOTHYROXINE SODIUM 50 UG/1
25 TABLET ORAL
Qty: 30 TABLET | Refills: 0 | Status: SHIPPED | OUTPATIENT
Start: 2025-06-21

## 2025-06-21 RX ORDER — POTASSIUM CHLORIDE 750 MG/1
10 TABLET, EXTENDED RELEASE ORAL DAILY
Qty: 30 TABLET | Refills: 0 | Status: SHIPPED | OUTPATIENT
Start: 2025-06-21

## 2025-06-21 RX ORDER — INSULIN GLARGINE 100 [IU]/ML
5 INJECTION, SOLUTION SUBCUTANEOUS NIGHTLY
Qty: 1 EACH | Refills: 0 | Status: SHIPPED | OUTPATIENT
Start: 2025-06-21

## 2025-06-21 RX ORDER — MAGNESIUM OXIDE 400 MG/1
800 TABLET ORAL ONCE
Status: COMPLETED | OUTPATIENT
Start: 2025-06-21 | End: 2025-06-21

## 2025-06-21 RX ORDER — ACETAMINOPHEN 500 MG
500 TABLET ORAL EVERY 6 HOURS PRN
Status: SHIPPED | COMMUNITY
Start: 2025-06-21

## 2025-06-21 NOTE — DISCHARGE INSTRUCTIONS
Ok to go home  Recheck tsh/t4/t3/b12 3 weeks  Cbc,bmp, mag, phos when sees primary  Hhc/pt/ot     Medication List        START taking these medications      acetaminophen 500 MG Tabs  Commonly known as: Tylenol Extra Strength     cephALEXin 500 MG Caps  Commonly known as: Keflex  Take 1 capsule (500 mg total) by mouth 2 (two) times daily for 5 doses. Start tonight stop if rash     cyanocobalamin 1000 MCG Tabs  Commonly known as: Vitamin B12  Take 1 tablet (1,000 mcg total) by mouth daily.     Magnesium Oxide 250 MG Tabs  Take 1 tablet (250 mg total) by mouth every other day for 30 doses. With food     potassium and sodium phosphates 305-700 MG Tabs  Commonly known as: K Phos 2  Take 1 tablet by mouth every other day for 30 doses. With food     potassium chloride 10 MEQ Tbcr  Commonly known as: Klor-Con M10  Take 1 tablet (10 mEq total) by mouth daily. With food            CHANGE how you take these medications      Basaglar KwikPen 100 UNIT/ML Sopn  Generic drug: insulin glargine  Inject 5 Units into the skin nightly. NEW DOSE  What changed: See the new instructions.     Jardiance 10 MG Tabs  Generic drug: empagliflozin  Take 1 tablet (10 mg total) by mouth daily.  What changed: See the new instructions.     levothyroxine 50 MCG Tabs  Commonly known as: Synthroid  Take 0.5 tablets (25 mcg total) by mouth before breakfast.  What changed:   medication strength  See the new instructions.            CONTINUE taking these medications      Ammonium Lactate 12 % Crea     aspirin 81 MG Tbec     BD Pen Needle Mini U/F 31G X 5 MM Misc  Generic drug: Insulin Pen Needle     cholecalciferol 125 MCG (5000 UT) Tabs  Commonly known as: Vitamin D3     donepezil 10 MG Tabs  Commonly known as: Aricept     glipiZIDE ER 10 MG Tb24  Commonly known as: Glucotrol XL     hydrocortisone 1 % Oint     metoprolol succinate ER 25 MG Tb24  Commonly known as: Toprol XL     rosuvastatin 40 MG Tabs  Commonly known as: Crestor            STOP taking  these medications      atorvastatin 40 MG Tabs  Commonly known as: Lipitor     lisinopril 10 MG Tabs  Commonly known as: Zestril     metFORMIN 850 MG Tabs  Commonly known as: Glucophage               Where to Get Your Medications        These medications were sent to [id number] - [place], [place] - [address] AT [facility], [phone], [phone] [address], [facility] [address]      Phone: [phone]   Basaglar KwikPen 100 UNIT/ML Sopn  cephALEXin 500 MG Caps  cyanocobalamin 1000 MCG Tabs  Jardiance 10 MG Tabs  levothyroxine 50 MCG Tabs  Magnesium Oxide 250 MG Tabs  potassium and sodium phosphates 305-700 MG Tabs  potassium chloride 10 MEQ Tbcr        [facility] - Resuming RN/PT/OT services  [address] [facility], [address] [address]  Phone: [phone]  Fax: [phone]

## 2025-06-21 NOTE — DISCHARGE SUMMARY
Dc summary#[id number]  > 30 min spent on dc    Hospital Discharge Diagnoses: uti with sepsis    Lace+ Score: 55  59-90 High Risk  29-58 Medium Risk  0-28   Low Risk.    TCM Follow-Up Recommendation:  LACE > 58: High Risk of readmission after discharge from the hospital. Tcm fu recommended

## 2025-06-21 NOTE — CM/SW NOTE
SW was informed by RN that pt's Nephew Edgar is here and would like to speak to SW.  SW met with Edgar who stated he would like for pt to return home with family care.  Per Edgar, patient will have family care 24/7 along with care giver.     Edgar stated they are working out solutions on hiring more care givers. SW provided care giver resources to Edgar.     Per Edgar, he would like for pt to resume services with [facility], pt is currently active with PT/RN services. JACOB entered and uploaded to [employer].    PLAN: Home w/ family and care giver & [facility].    SW/CM to remain available for support and/or discharge planning.    Moni, MSW, LSW [id number]

## 2025-06-21 NOTE — PLAN OF CARE
Problem: Patient Centered Care  Goal: Patient preferences are identified and integrated in the patient's plan of care  Description: Interventions:  - What would you like us to know as we care for you? From home alone with caregiver for part of the day.  - Provide timely, complete, and accurate information to patient/family  - Incorporate patient and family knowledge, values, beliefs, and cultural backgrounds into the planning and delivery of care  - Encourage patient/family to participate in care and decision-making at the level they choose  - Honor patient and family perspectives and choices  Outcome: Adequate for Discharge     Problem: Diabetes/Glucose Control  Goal: Glucose maintained within prescribed range  Description: INTERVENTIONS:  - Monitor Blood Glucose as ordered  - Assess for signs and symptoms of hyperglycemia and hypoglycemia  - Administer ordered medications to maintain glucose within target range  - Assess barriers to adequate nutritional intake and initiate nutrition consult as needed  - Instruct patient on self management of diabetes  Outcome: Adequate for Discharge        Problem: SAFETY ADULT - FALL  Goal: Free from fall injury  Description: INTERVENTIONS:  - Assess pt frequently for physical needs  - Identify cognitive and physical deficits and behaviors that affect risk of falls.  - [place] fall precautions as indicated by assessment.  - Educate pt/family on patient safety including physical limitations  - Instruct pt to call for assistance with activity based on assessment  - Modify environment to reduce risk of injury  - Provide assistive devices as appropriate  - Consider OT/PT consult to assist with strengthening/mobility  - Encourage toileting schedule  Outcome: Adequate for Discharge     Problem: DISCHARGE PLANNING  Goal: Discharge to home or other facility with appropriate resources  Description: INTERVENTIONS:  - Identify barriers to discharge w/pt and caregiver  - Include  patient/family/discharge partner in discharge planning  - Arrange for needed discharge resources and transportation as appropriate  - Identify discharge learning needs (meds, wound care, etc)  - Arrange for interpreters to assist at discharge as needed  - Consider post-discharge preferences of patient/family/discharge partner  - Complete POLST form as appropriate  - Assess patient's ability to be responsible for managing their own health  - Refer to Case Management Department for coordinating discharge planning if the patient needs post-hospital services based on physician/LIP order or complex needs related to functional status, cognitive ability or social support system  Outcome: Adequate for Discharge     Problem: Altered Communication/Language Barrier  Goal: Patient/Family is able to understand and participate in their care  Description: Interventions:  - Assess communication ability and preferred communication style  - Implement communication aides and strategies  - Use visual cues when possible  - Listen attentively, be patient, do not interrupt  - Minimize distractions  - Allow time for understanding and response  - Establish method for patient to ask for assistance (call light)  - Provide an  as needed  - Communicate barriers and strategies to overcome with those who interact with patient  Outcome: Adequate for Discharge     Problem: METABOLIC/FLUID AND ELECTROLYTES - ADULT  Goal: Glucose maintained within prescribed range  Description: INTERVENTIONS:  - Monitor Blood Glucose as ordered  - Assess for signs and symptoms of hyperglycemia and hypoglycemia  - Administer ordered medications to maintain glucose within target range  - Assess barriers to adequate nutritional intake and initiate nutrition consult as needed  - Instruct patient on self management of diabetes  Outcome: Adequate for Discharge  Goal: Electrolytes maintained within normal limits  Description: INTERVENTIONS:  - Monitor labs and  rhythm and assess patient for signs and symptoms of electrolyte imbalances  - Administer electrolyte replacement as ordered  - Monitor response to electrolyte replacements, including rhythm and repeat lab results as appropriate  - Fluid restriction as ordered  - Instruct patient on fluid and nutrition restrictions as appropriate  Outcome: Adequate for Discharge  Goal: Hemodynamic stability and optimal renal function maintained  Description: INTERVENTIONS:  - Monitor labs and assess for signs and symptoms of volume excess or deficit  - Monitor intake, output and patient weight  - Monitor urine specific gravity, serum osmolarity and serum sodium as indicated or ordered  - Monitor response to interventions for patient's volume status, including labs, urine output, blood pressure (other measures as available)  - Encourage oral intake as appropriate  - Instruct patient on fluid and nutrition restrictions as appropriate  Outcome: Adequate for Discharge     Problem: Impaired Activities of Daily Living  Goal: Achieve highest/safest level of independence in self care  Description: Interventions:  - Assess ability and encourage patient to participate in ADLs to maximize function  - Promote sitting position while performing ADLs such as feeding, grooming, and bathing  - Educate and encourage patient/family in tolerated functional activity level and precautions during self-care    Outcome: Adequate for Discharge     Problem: Impaired Cognition  Goal: Patient will exhibit improved attention, thought processing and/or memory  Description: Interventions:    Outcome: Adequate for Discharge    IV removed. I went over AVS paperwork with nephew and patient, answered all questions. Patient went down via wheelchair.

## 2025-06-21 NOTE — PLAN OF CARE
Problem: Patient Centered Care  Goal: Patient preferences are identified and integrated in the patient's plan of care  Description: Interventions:  - What would you like us to know as we care for you? From home alone with caregiver for part of the day.  - Provide timely, complete, and accurate information to patient/family  - Incorporate patient and family knowledge, values, beliefs, and cultural backgrounds into the planning and delivery of care  - Encourage patient/family to participate in care and decision-making at the level they choose  - Honor patient and family perspectives and choices  Outcome: Progressing     Problem: Diabetes/Glucose Control  Goal: Glucose maintained within prescribed range  Description: INTERVENTIONS:  - Monitor Blood Glucose as ordered  - Assess for signs and symptoms of hyperglycemia and hypoglycemia  - Administer ordered medications to maintain glucose within target range  - Assess barriers to adequate nutritional intake and initiate nutrition consult as needed  - Instruct patient on self management of diabetes  Outcome: Progressing     Problem: Patient/Family Goals  Goal: Patient/Family Long Term Goal  Description: Patient's Long Term Goal:    Interventions:  - See additional Care Plan goals for specific interventions  Outcome: Progressing  Goal: Patient/Family Short Term Goal  Description: Patient's Short Term Goal:    Interventions:   - See additional Care Plan goals for specific interventions  Outcome: Progressing     Problem: SAFETY ADULT - FALL  Goal: Free from fall injury  Description: INTERVENTIONS:  - Assess pt frequently for physical needs  - Identify cognitive and physical deficits and behaviors that affect risk of falls.  - [place] fall precautions as indicated by assessment.  - Educate pt/family on patient safety including physical limitations  - Instruct pt to call for assistance with activity based on assessment  - Modify environment to reduce risk of injury  -  Provide assistive devices as appropriate  - Consider OT/PT consult to assist with strengthening/mobility  - Encourage toileting schedule  Outcome: Progressing     Problem: DISCHARGE PLANNING  Goal: Discharge to home or other facility with appropriate resources  Description: INTERVENTIONS:  - Identify barriers to discharge w/pt and caregiver  - Include patient/family/discharge partner in discharge planning  - Arrange for needed discharge resources and transportation as appropriate  - Identify discharge learning needs (meds, wound care, etc)  - Arrange for interpreters to assist at discharge as needed  - Consider post-discharge preferences of patient/family/discharge partner  - Complete POLST form as appropriate  - Assess patient's ability to be responsible for managing their own health  - Refer to Case Management Department for coordinating discharge planning if the patient needs post-hospital services based on physician/LIP order or complex needs related to functional status, cognitive ability or social support system  Outcome: Progressing     Problem: Altered Communication/Language Barrier  Goal: Patient/Family is able to understand and participate in their care  Description: Interventions:  - Assess communication ability and preferred communication style  - Implement communication aides and strategies  - Use visual cues when possible  - Listen attentively, be patient, do not interrupt  - Minimize distractions  - Allow time for understanding and response  - Establish method for patient to ask for assistance (call light)  - Provide an  as needed  - Communicate barriers and strategies to overcome with those who interact with patient  Outcome: Progressing     Problem: METABOLIC/FLUID AND ELECTROLYTES - ADULT  Goal: Glucose maintained within prescribed range  Description: INTERVENTIONS:  - Monitor Blood Glucose as ordered  - Assess for signs and symptoms of hyperglycemia and hypoglycemia  - Administer  ordered medications to maintain glucose within target range  - Assess barriers to adequate nutritional intake and initiate nutrition consult as needed  - Instruct patient on self management of diabetes  Outcome: Progressing  Goal: Electrolytes maintained within normal limits  Description: INTERVENTIONS:  - Monitor labs and rhythm and assess patient for signs and symptoms of electrolyte imbalances  - Administer electrolyte replacement as ordered  - Monitor response to electrolyte replacements, including rhythm and repeat lab results as appropriate  - Fluid restriction as ordered  - Instruct patient on fluid and nutrition restrictions as appropriate  Outcome: Progressing  Goal: Hemodynamic stability and optimal renal function maintained  Description: INTERVENTIONS:  - Monitor labs and assess for signs and symptoms of volume excess or deficit  - Monitor intake, output and patient weight  - Monitor urine specific gravity, serum osmolarity and serum sodium as indicated or ordered  - Monitor response to interventions for patient's volume status, including labs, urine output, blood pressure (other measures as available)  - Encourage oral intake as appropriate  - Instruct patient on fluid and nutrition restrictions as appropriate  Outcome: Progressing     Problem: Impaired Activities of Daily Living  Goal: Achieve highest/safest level of independence in self care  Description: Interventions:  - Assess ability and encourage patient to participate in ADLs to maximize function  - Promote sitting position while performing ADLs such as feeding, grooming, and bathing  - Educate and encourage patient/family in tolerated functional activity level and precautions during self-car  Outcome: Progressing     Problem: Impaired Cognition  Goal: Patient will exhibit improved attention, thought processing and/or memory  Description: Interventions:  Outcome: Progressing

## 2025-06-23 ENCOUNTER — PATIENT OUTREACH (OUTPATIENT)
Dept: CASE MANAGEMENT | Age: OVER 89
End: 2025-06-23

## 2025-06-23 NOTE — PROGRESS NOTES
DM appointment request (discharged 06/21)    URSZULA Velarde  [address] [address]  [phone]  Per office, the will call the patient's nephew if an sooner appointment opens up  Left Voicemail letting patient's nephew with information; if still need assistance to call [phone]   Patient's nephew called right back, gave him the information  Patient's nephew, Gabriel verbalized understanding  Closing encounter

## 2025-06-23 NOTE — DISCHARGE SUMMARY
[facility]    PATIENT'S NAME: SUE COCHRAN   ATTENDING PHYSICIAN: Herman Jeter MD   PATIENT ACCOUNT#:   [id number]    LOCATION:  [address] [place] [address]  MEDICAL RECORD #:   [id number]       YOB: 1931  ADMISSION DATE:       06/16/2025      DISCHARGE DATE:  06/21/2025    DISCHARGE SUMMARY    About 45 minutes were spent preparing this discharge.     DISCHARGE DIAGNOSIS:  Urinary tract infection with sepsis manifesting as weakness and some mental status changes, some mild confusion.    HISTORY AND HOSPITAL COURSE:  This is a very pleasant 89-year-old  American Italian-speaking female who presents with a history of dehydration and weakness and perhaps a worsening of her dementia.  She actually lives alone and her closest relatives are some cousins or nephews and 2 sisters who live in [place].  None of them are able to come here and help take care of her.  She had 1 nephew who was a licensed physical therapist who could take care of her, and we waited for him to arrive.  She slowly improved.  With fluids, her creatine actually improved from 2.75 to 1.55, which seems to be her new baseline.  It had been 1.05 two years ago.  She was able to eat and drink and she was able to normally converse and did not shout out things or ask things in a seemingly disorganized manner like she did when she first came in.  She was weak and had difficulty walking around initially and was entertained sending her to a subacute rehab, but she improved to the point that her nephew, who is a physical therapist, was able to arrange for her care at home and she was discharged home with home health, PT, and OT.  For the times when he is not able to be there, he will arrange care for her at home.    PHYSICAL EXAMINATION ON DISCHARGE:    VITAL SIGNS:  Temperature is 99, pulse 88, respiratory rate 18, blood pressure 147/70, 95%.  LUNGS:  Occasional rhonchi.  HEART:  Normal S1, S2.  No S3.  ABDOMEN:  Soft and  nontender.  EXTREMITIES:  Without calf tenderness.  NEUROLOGIC:  She is alert and oriented, friendly and cooperative.    LABORATORY STUDIES:  Please see chart.      ASSESSMENT AND PLAN:    1.   Acute renal failure from dehydration.  Has improvement, probably on chronic kidney disease stage 3.  2.   Urinary tract infection with sepsis manifesting as weakness and confusion.  Escherichia coli sensitive to Keflex.  Will be discharged home on that.   3.   Type 2 diabetes.  Continue medications.  4.   Dementia.  Resume home medications.  5.   Deep vein thrombosis prophylaxis.  Subcutaneous heparin.  6.   Low normal B12 in a non-vegetarian.  Will replace.    7.   Hypothyroidism, possibly new.  Will start small dose of Synthroid.  8.   Hypomagnesemia.  Replace.  9.   Hypokalemia.  Replace.  10.   Hypophosphatemia.  Replace.    CONDITION ON DISCHARGE:  Stable.    CODE STATUS:  Not discussed during this hospitalization.    ACTIVITIES:  PT/OT, home healthcare; otherwise, as tolerated.    FOLLOWUP:  Home healthcare, PT and OT.  Return if fevers, chills, sweats, nausea, vomiting, chest pain, shortness of breath, or other complaints.  Please note the patient has no family members and lives alone so her nephew, Gabriel, will be her surrogate.  He seems to care for her deeply and is also a licensed physical therapist so he understands her needs.  Recheck TSH, T4, T3, and B12 in 3 weeks; CBC, BMP, mag and phos when sees primary.      DISCHARGE MEDICATIONS:    1.   Imodium lactate as needed.  2.   Ecotrin 81 mg daily with food.  3.   Will hold off on Lipitor for now in case it was contributing to her weakness.    4.   Basaglar 5 units nightly.  5.   Vitamin D 5000 units in the morning.  Her primary to recheck this dose as it may be too high.  6.   Donepezil 10 mg in the morning.  7.   Glipizide ER 10 mg before breakfast.    8.   Hydrocortisone twice a day.  9.   Jardiance 10 mg daily.  10.   Metoprolol ER 25 mg in the morning.  11.    Will start Crestor 40 mg nightly.  Stop if muscle weakness.  She should not be on both Lipitor and Crestor.  12.   Synthroid 25 mcg before breakfast.  13.   Tylenol 500 mg every 6 hours as needed.  Watch total daily Tylenol dose under 3 g.   14.   Keflex 500 mg twice a day for 5 more doses.  Stop if rash.    15.   Cyanocobalamin 1000 mcg by mouth daily.  Recheck B12 in 3 weeks.  16.   Magnesium oxide 250 mg every other day for 30 doses.  17.   Neutra-Phos 1 by mouth every other day for 30 doses.  18.   K-Dur 10 mEq daily.    RISK OF READMISSION:  Astronomically high.  TCM followup is recommended.      Dictated By Herman Jeter MD  d: 06/22/2025 17:41:07  t: 06/23/2025 04:50:16  Job [id number]  [facility]/

## 2025-06-24 NOTE — PROGRESS NOTES
Provider Clarification     Additional information on the status of  of the sepsis  Sepsis from uti manifesting as weakness and confusion with tachycardia and renal failure as markers    This note is part of the patient's medical record.